# Patient Record
Sex: MALE | Race: WHITE | NOT HISPANIC OR LATINO | Employment: FULL TIME | URBAN - NONMETROPOLITAN AREA
[De-identification: names, ages, dates, MRNs, and addresses within clinical notes are randomized per-mention and may not be internally consistent; named-entity substitution may affect disease eponyms.]

---

## 2023-02-02 PROBLEM — K42.0 UMBILICAL HERNIA, INCARCERATED: Status: ACTIVE | Noted: 2023-02-02

## 2023-02-02 PROBLEM — R73.9 HYPERGLYCEMIA: Status: ACTIVE | Noted: 2023-02-02

## 2023-02-02 PROBLEM — K52.9 AGE (ACUTE GASTROENTERITIS): Status: ACTIVE | Noted: 2023-02-02

## 2023-02-02 PROBLEM — H61.21 IMPACTED CERUMEN, RIGHT EAR: Status: ACTIVE | Noted: 2023-02-02

## 2023-02-02 PROBLEM — B35.1 ONYCHOMYCOSIS OF RIGHT GREAT TOE: Status: ACTIVE | Noted: 2023-02-02

## 2023-02-02 PROBLEM — H93.8X9 EAR FULLNESS: Status: ACTIVE | Noted: 2023-02-02

## 2023-02-02 PROBLEM — I10 BENIGN HYPERTENSION: Status: ACTIVE | Noted: 2023-02-02

## 2023-02-02 PROBLEM — N52.9 ED (ERECTILE DYSFUNCTION): Status: ACTIVE | Noted: 2023-02-02

## 2023-02-02 PROBLEM — E78.1 HYPERTRIGLYCERIDEMIA: Status: ACTIVE | Noted: 2023-02-02

## 2023-02-02 PROBLEM — E11.9 DIABETES MELLITUS (MULTI): Status: ACTIVE | Noted: 2023-02-02

## 2023-02-02 RX ORDER — TADALAFIL 20 MG/1
1 TABLET ORAL DAILY PRN
COMMUNITY
Start: 2022-06-16 | End: 2023-07-13 | Stop reason: ALTCHOICE

## 2023-02-02 RX ORDER — LISINOPRIL 10 MG/1
1 TABLET ORAL DAILY
COMMUNITY
Start: 2016-07-18 | End: 2023-03-07 | Stop reason: SDUPTHER

## 2023-03-06 NOTE — PROGRESS NOTES
Subjective   Patient ID:   Logan Yeager is a 52 y.o. male who presents for Back Pain (Check up/Mid back pain).  HPI  Back pain:  Symptoms x 2 weeks.  No acute injury.  Middle to low back.  Having troubles with sleep.  Worse with movements.    HTN:  Taking Lisinopril.  BP today is 142/94.  Denies dizziness, headaches.     Diabetes:  New onset in July 2022 with A1C of 6.6.  A1C was 6.4 in Dec 2022.  He was to work on diet and exercise.  DUE for re-check.     ED:  Taking Viagra as needed.  This is hit and miss.     HLD:  Not on any medication for this.  Last checked July 2022.     Right foot fungus:  Taking Jublia.     Health maintenance:  Smoking: Current smoker - 1 ppd. Declines cessation  PSA (50+): July 2022  Labs: July 2022. DUE for A1C  Colonoscopy (50-75): DUE - Declines  Influenza:     Review of Systems  12 point review of systems negative unless stated above in HPI    Vitals:    03/07/23 1041   BP: (!) 142/94   Pulse: 66   Resp: 18   SpO2: 94%       Physical Exam  General: Alert and oriented, well nourished, no acute distress.  Lungs: Clear to auscultation, non-labored respiration.  Heart: Normal rate, regular rhythm, no murmur, gallop or edema.  Neurologic: Awake, alert, and oriented X3, CN II-XII intact.  Psychiatric: Cooperative, appropriate mood and affect.    Assessment/Plan   I have sent in Meloxicam for the back pains.  Consider imaging if this does not help.  I have also increased your Lisinopril to 20mg.  Consider checking BP at home.  If symptoms persist or worsen despite current plan of care, please contact your healthcare provider for further evaluation.  Patient instructed to contact the office if there are any questions regarding their care or treatment.   [facility] [phone].  [facility] [phone].  I have ordered some labs to be done as soon as you can.  We will call you with the results.    Follow up  1-2 months  Diagnoses and all orders for this  visit:  Type 2 diabetes mellitus without complication, without long-term current use of insulin (CMS/[facility])  -     Hemoglobin A1C; Future  Benign hypertension  -     lisinopril 20 mg tablet; Take 1 tablet (20 mg) by mouth once daily.  Erectile dysfunction, unspecified erectile dysfunction type  Hypertriglyceridemia  Onychomycosis of right great toe  Acute bilateral thoracic back pain  -     meloxicam (Mobic) 15 mg tablet; Take 1 tablet (15 mg) by mouth once daily.

## 2023-03-07 ENCOUNTER — OFFICE VISIT (OUTPATIENT)
Dept: PRIMARY CARE | Facility: CLINIC | Age: 53
End: 2023-03-07
Payer: COMMERCIAL

## 2023-03-07 ENCOUNTER — APPOINTMENT (OUTPATIENT)
Dept: LAB | Facility: LAB | Age: 53
End: 2023-03-07
Payer: COMMERCIAL

## 2023-03-07 VITALS
WEIGHT: 315 LBS | OXYGEN SATURATION: 94 % | HEART RATE: 66 BPM | BODY MASS INDEX: 42.66 KG/M2 | DIASTOLIC BLOOD PRESSURE: 94 MMHG | HEIGHT: 72 IN | SYSTOLIC BLOOD PRESSURE: 142 MMHG | RESPIRATION RATE: 18 BRPM

## 2023-03-07 DIAGNOSIS — B35.1 ONYCHOMYCOSIS OF RIGHT GREAT TOE: ICD-10-CM

## 2023-03-07 DIAGNOSIS — I10 BENIGN HYPERTENSION: ICD-10-CM

## 2023-03-07 DIAGNOSIS — E11.9 TYPE 2 DIABETES MELLITUS WITHOUT COMPLICATION, WITHOUT LONG-TERM CURRENT USE OF INSULIN (MULTI): Primary | ICD-10-CM

## 2023-03-07 DIAGNOSIS — N52.9 ERECTILE DYSFUNCTION, UNSPECIFIED ERECTILE DYSFUNCTION TYPE: ICD-10-CM

## 2023-03-07 DIAGNOSIS — E78.1 HYPERTRIGLYCERIDEMIA: ICD-10-CM

## 2023-03-07 DIAGNOSIS — M54.6 ACUTE BILATERAL THORACIC BACK PAIN: ICD-10-CM

## 2023-03-07 DIAGNOSIS — E11.9 TYPE 2 DIABETES MELLITUS WITHOUT COMPLICATION, WITHOUT LONG-TERM CURRENT USE OF INSULIN (MULTI): ICD-10-CM

## 2023-03-07 PROCEDURE — 4010F ACE/ARB THERAPY RXD/TAKEN: CPT | Performed by: PHYSICIAN ASSISTANT

## 2023-03-07 PROCEDURE — 99214 OFFICE O/P EST MOD 30 MIN: CPT | Performed by: PHYSICIAN ASSISTANT

## 2023-03-07 PROCEDURE — 3080F DIAST BP >= 90 MM HG: CPT | Performed by: PHYSICIAN ASSISTANT

## 2023-03-07 PROCEDURE — 3077F SYST BP >= 140 MM HG: CPT | Performed by: PHYSICIAN ASSISTANT

## 2023-03-07 RX ORDER — LISINOPRIL 20 MG/1
20 TABLET ORAL DAILY
Qty: 30 TABLET | Refills: 1 | Status: SHIPPED | OUTPATIENT
Start: 2023-03-07 | End: 2023-04-11 | Stop reason: SDUPTHER

## 2023-03-07 RX ORDER — SILDENAFIL 100 MG/1
100 TABLET, FILM COATED ORAL AS NEEDED
COMMUNITY
Start: 2022-04-03 | End: 2023-07-13 | Stop reason: ALTCHOICE

## 2023-03-07 RX ORDER — MELOXICAM 15 MG/1
15 TABLET ORAL DAILY
Qty: 30 TABLET | Refills: 1 | Status: SHIPPED | OUTPATIENT
Start: 2023-03-07 | End: 2023-04-11 | Stop reason: ALTCHOICE

## 2023-03-07 ASSESSMENT — PAIN SCALES - GENERAL: PAINLEVEL: 6

## 2023-03-08 LAB
ESTIMATED AVERAGE GLUCOSE FOR HBA1C: 137 MG/DL
HEMOGLOBIN A1C/HEMOGLOBIN TOTAL IN BLOOD: 6.4 %

## 2023-04-04 PROBLEM — M54.6 ACUTE BILATERAL THORACIC BACK PAIN: Status: ACTIVE | Noted: 2023-04-04

## 2023-04-04 NOTE — PROGRESS NOTES
Subjective   Patient ID:   Logan Yeager is a 52 y.o. male who presents for Follow-up (General checkup).  HPI  Back pain:  I gave Meloxicam last visit.  This resolved!    HTN:  Taking Lisinopril - we increased this last visit.  Had been on hydrochlorothiazide as well before, but this caused his BP to go too low.  BP today is 149/91.  Denies dizziness, headaches.     Diabetes:  New onset in July 2022 with A1C of 6.6.  A1C was 6.4 in March 2023.  He was to work on diet and exercise.     ED:  Taking Viagra as needed.  This is hit and   Has tried and failed Cialis as well.  No recent testosterone level.     HLD:  Not on any medication for this.  Last checked July 2022.     Right foot fungus:  Taking Jublia.     Health maintenance:  Smoking: Current smoker - 1 ppd. Declines cessation  PSA (50+): July 2022  Labs: July 2022.  Colonoscopy (50-75): DUE - Declines  Influenza:     Review of Systems  12 point review of systems negative unless stated above in HPI    Vitals:    04/11/23 0819   BP: (!) 149/91   Pulse: 75   SpO2: 98%         Physical Exam  General: Alert and oriented, well nourished, no acute distress.  Lungs: Clear to auscultation, non-labored respiration.  Heart: Normal rate, regular rhythm, no murmur, gallop or edema.  Neurologic: Awake, alert, and oriented X3, CN II-XII intact.  Psychiatric: Cooperative, appropriate mood and affect.    Assessment/Plan   BP is still high today.  I have increased the Lisinopril to 40mg.  Consider checking at home.  I have placed a referral to urology for further management.  I have ordered some labs to be done as soon as you can.  We will call you with the results.  Continue the same medications.  Chronic conditions are stable.  Call with questions or concerns.    F/u  In July for labs  Diagnoses and all orders for this visit:  Type 2 diabetes mellitus without complication, without long-term current use of insulin (CMS/[facility])  Benign hypertension  -     lisinopril 40 mg tablet;  Take 1 tablet (40 mg) by mouth once daily.  Erectile dysfunction, unspecified erectile dysfunction type  -     Testosterone; Future  -     Referral to Urology; Future  Hypertriglyceridemia  Acute bilateral thoracic back pain  Onychomycosis of right great toe

## 2023-04-11 ENCOUNTER — OFFICE VISIT (OUTPATIENT)
Dept: PRIMARY CARE | Facility: CLINIC | Age: 53
End: 2023-04-11
Payer: COMMERCIAL

## 2023-04-11 ENCOUNTER — LAB (OUTPATIENT)
Dept: LAB | Facility: LAB | Age: 53
End: 2023-04-11
Payer: COMMERCIAL

## 2023-04-11 VITALS
BODY MASS INDEX: 42.66 KG/M2 | WEIGHT: 315 LBS | HEIGHT: 72 IN | DIASTOLIC BLOOD PRESSURE: 91 MMHG | SYSTOLIC BLOOD PRESSURE: 149 MMHG | OXYGEN SATURATION: 98 % | HEART RATE: 75 BPM

## 2023-04-11 DIAGNOSIS — E11.9 TYPE 2 DIABETES MELLITUS WITHOUT COMPLICATION, WITHOUT LONG-TERM CURRENT USE OF INSULIN (MULTI): Primary | ICD-10-CM

## 2023-04-11 DIAGNOSIS — I10 BENIGN HYPERTENSION: ICD-10-CM

## 2023-04-11 DIAGNOSIS — M54.6 ACUTE BILATERAL THORACIC BACK PAIN: ICD-10-CM

## 2023-04-11 DIAGNOSIS — E78.1 HYPERTRIGLYCERIDEMIA: ICD-10-CM

## 2023-04-11 DIAGNOSIS — B35.1 ONYCHOMYCOSIS OF RIGHT GREAT TOE: ICD-10-CM

## 2023-04-11 DIAGNOSIS — N52.9 ERECTILE DYSFUNCTION, UNSPECIFIED ERECTILE DYSFUNCTION TYPE: ICD-10-CM

## 2023-04-11 PROCEDURE — 4010F ACE/ARB THERAPY RXD/TAKEN: CPT | Performed by: PHYSICIAN ASSISTANT

## 2023-04-11 PROCEDURE — 84403 ASSAY OF TOTAL TESTOSTERONE: CPT

## 2023-04-11 PROCEDURE — 36415 COLL VENOUS BLD VENIPUNCTURE: CPT

## 2023-04-11 PROCEDURE — 3080F DIAST BP >= 90 MM HG: CPT | Performed by: PHYSICIAN ASSISTANT

## 2023-04-11 PROCEDURE — 3044F HG A1C LEVEL LT 7.0%: CPT | Performed by: PHYSICIAN ASSISTANT

## 2023-04-11 PROCEDURE — 99214 OFFICE O/P EST MOD 30 MIN: CPT | Performed by: PHYSICIAN ASSISTANT

## 2023-04-11 PROCEDURE — 3077F SYST BP >= 140 MM HG: CPT | Performed by: PHYSICIAN ASSISTANT

## 2023-04-11 RX ORDER — LISINOPRIL 40 MG/1
40 TABLET ORAL DAILY
Qty: 90 TABLET | Refills: 1 | Status: SHIPPED | OUTPATIENT
Start: 2023-04-11 | End: 2023-08-23

## 2023-04-11 ASSESSMENT — PATIENT HEALTH QUESTIONNAIRE - PHQ9
1. LITTLE INTEREST OR PLEASURE IN DOING THINGS: NOT AT ALL
SUM OF ALL RESPONSES TO PHQ9 QUESTIONS 1 AND 2: 0
2. FEELING DOWN, DEPRESSED OR HOPELESS: NOT AT ALL

## 2023-04-12 LAB — TESTOSTERONE (NG/DL) IN SER/PLAS: 143 NG/DL (ref 240–1000)

## 2023-07-05 PROBLEM — R79.89 LOW TESTOSTERONE: Status: ACTIVE | Noted: 2023-07-05

## 2023-07-05 NOTE — PROGRESS NOTES
Subjective   Patient ID:   Logan Yeager is a 53 y.o. male who presents for Follow-up.  HPI  HTN:  Taking Lisinopril - we increased this last visit.  Had been on hydrochlorothiazide as well before, but this caused his BP to go too low.  BP today is 140/83.  Denies dizziness, headaches.     Diabetes:  New onset in July 2022 with A1C of 6.6.  A1C was 6.4 in March 2023.  He was to work on diet and exercise.  DUE for re-check.     ED:  I referred to urology last visit.  Taking Viagra as needed.  This is hit and miss.  Has tried and failed Cialis as well.  Testosterone was low in April 2023.     HLD:  Not on any medication for this.  Last checked July 2022.  DUE for labs.     Right foot fungus:  Taking Jublia.  This is still bothersome.  Would like his toenail trimmed.     Health maintenance:  Smoking: Current smoker - 1 ppd. Declines cessation  PSA (50+): July 2022. DUE  Labs: July 2022. DUE  Colonoscopy (50-75): DUE - Declines  Influenza:     Review of Systems  12 point review of systems negative unless stated above in HPI    Vitals:    07/13/23 0836   BP: 140/83   Pulse: 66   Resp: 18   SpO2: 90%       Physical Exam  General: Alert and oriented, well nourished, no acute distress.  Lungs: Clear to auscultation, non-labored respiration.  Heart: Normal rate, regular rhythm, no murmur, gallop or edema.  Neurologic: Awake, alert, and oriented X3, CN II-XII intact.  Psychiatric: Cooperative, appropriate mood and affect.    Assessment/Plan   Your BP is better today!  Consider checking at home.  I have placed a referral to podiatry for further management.  I have ordered some labs to be done as soon as you can.  We will call you with the results.  Continue the same medications.  Chronic conditions are stable.  Call with questions or concerns.    F/u  6 months  Diagnoses and all orders for this visit:  Type 2 diabetes mellitus without complication, without long-term current use of insulin (CMS/[facility])  -     Comprehensive Metabolic  Panel; Future  -     Lipid Panel; Future  -     CBC and Auto Differential; Future  -     Hemoglobin A1C; Future  Benign hypertension  Erectile dysfunction, unspecified erectile dysfunction type  Hypertriglyceridemia  Onychomycosis of right great toe  -     Referral to Podiatry; Future  Low testosterone  Screening PSA (prostate specific antigen)  -     Prostate Specific Antigen, Screen; Future

## 2023-07-13 ENCOUNTER — OFFICE VISIT (OUTPATIENT)
Dept: PRIMARY CARE | Facility: CLINIC | Age: 53
End: 2023-07-13
Payer: COMMERCIAL

## 2023-07-13 VITALS
RESPIRATION RATE: 18 BRPM | BODY MASS INDEX: 42.66 KG/M2 | DIASTOLIC BLOOD PRESSURE: 83 MMHG | HEART RATE: 66 BPM | HEIGHT: 72 IN | WEIGHT: 315 LBS | SYSTOLIC BLOOD PRESSURE: 140 MMHG | OXYGEN SATURATION: 90 %

## 2023-07-13 DIAGNOSIS — N52.9 ERECTILE DYSFUNCTION, UNSPECIFIED ERECTILE DYSFUNCTION TYPE: ICD-10-CM

## 2023-07-13 DIAGNOSIS — Z12.5 SCREENING PSA (PROSTATE SPECIFIC ANTIGEN): ICD-10-CM

## 2023-07-13 DIAGNOSIS — E78.1 HYPERTRIGLYCERIDEMIA: ICD-10-CM

## 2023-07-13 DIAGNOSIS — I10 BENIGN HYPERTENSION: ICD-10-CM

## 2023-07-13 DIAGNOSIS — E11.9 TYPE 2 DIABETES MELLITUS WITHOUT COMPLICATION, WITHOUT LONG-TERM CURRENT USE OF INSULIN (MULTI): Primary | ICD-10-CM

## 2023-07-13 DIAGNOSIS — B35.1 ONYCHOMYCOSIS OF RIGHT GREAT TOE: ICD-10-CM

## 2023-07-13 DIAGNOSIS — R79.89 LOW TESTOSTERONE: ICD-10-CM

## 2023-07-13 PROBLEM — K42.9 UMBILICAL HERNIA: Status: ACTIVE | Noted: 2023-02-02

## 2023-07-13 PROBLEM — F17.200 SMOKING: Status: ACTIVE | Noted: 2023-07-13

## 2023-07-13 PROBLEM — H61.21 IMPACTED CERUMEN OF RIGHT EAR: Status: RESOLVED | Noted: 2023-07-13 | Resolved: 2023-07-13

## 2023-07-13 PROBLEM — E66.01 MORBID OBESITY (MULTI): Status: ACTIVE | Noted: 2023-07-13

## 2023-07-13 PROCEDURE — 3079F DIAST BP 80-89 MM HG: CPT | Performed by: PHYSICIAN ASSISTANT

## 2023-07-13 PROCEDURE — 3077F SYST BP >= 140 MM HG: CPT | Performed by: PHYSICIAN ASSISTANT

## 2023-07-13 PROCEDURE — 3044F HG A1C LEVEL LT 7.0%: CPT | Performed by: PHYSICIAN ASSISTANT

## 2023-07-13 PROCEDURE — 99213 OFFICE O/P EST LOW 20 MIN: CPT | Performed by: PHYSICIAN ASSISTANT

## 2023-07-13 PROCEDURE — 4010F ACE/ARB THERAPY RXD/TAKEN: CPT | Performed by: PHYSICIAN ASSISTANT

## 2023-07-13 RX ORDER — TADALAFIL 10 MG/1
10 TABLET ORAL AS NEEDED
COMMUNITY
End: 2024-01-11 | Stop reason: ALTCHOICE

## 2023-07-13 RX ORDER — TADALAFIL 5 MG/1
5 TABLET ORAL DAILY
COMMUNITY

## 2023-07-13 ASSESSMENT — PATIENT HEALTH QUESTIONNAIRE - PHQ9
SUM OF ALL RESPONSES TO PHQ9 QUESTIONS 1 AND 2: 0
2. FEELING DOWN, DEPRESSED OR HOPELESS: NOT AT ALL
1. LITTLE INTEREST OR PLEASURE IN DOING THINGS: NOT AT ALL

## 2023-07-13 ASSESSMENT — PAIN SCALES - GENERAL: PAINLEVEL: 0-NO PAIN

## 2023-07-15 ENCOUNTER — LAB (OUTPATIENT)
Dept: LAB | Facility: LAB | Age: 53
End: 2023-07-15
Payer: COMMERCIAL

## 2023-07-15 DIAGNOSIS — E11.9 TYPE 2 DIABETES MELLITUS WITHOUT COMPLICATION, WITHOUT LONG-TERM CURRENT USE OF INSULIN (MULTI): ICD-10-CM

## 2023-07-15 DIAGNOSIS — Z12.5 SCREENING PSA (PROSTATE SPECIFIC ANTIGEN): ICD-10-CM

## 2023-07-15 LAB
ALANINE AMINOTRANSFERASE (SGPT) (U/L) IN SER/PLAS: 35 U/L (ref 10–52)
ALBUMIN (G/DL) IN SER/PLAS: 4.2 G/DL (ref 3.4–5)
ALKALINE PHOSPHATASE (U/L) IN SER/PLAS: 61 U/L (ref 33–120)
ANION GAP IN SER/PLAS: 10 MMOL/L (ref 10–20)
ASPARTATE AMINOTRANSFERASE (SGOT) (U/L) IN SER/PLAS: 25 U/L (ref 9–39)
BASOPHILS (10*3/UL) IN BLOOD BY AUTOMATED COUNT: 0.04 X10E9/L (ref 0–0.1)
BASOPHILS/100 LEUKOCYTES IN BLOOD BY AUTOMATED COUNT: 0.6 % (ref 0–2)
BILIRUBIN TOTAL (MG/DL) IN SER/PLAS: 0.6 MG/DL (ref 0–1.2)
CALCIUM (MG/DL) IN SER/PLAS: 9.1 MG/DL (ref 8.6–10.3)
CARBON DIOXIDE, TOTAL (MMOL/L) IN SER/PLAS: 30 MMOL/L (ref 21–32)
CHLORIDE (MMOL/L) IN SER/PLAS: 103 MMOL/L (ref 98–107)
CHOLESTEROL (MG/DL) IN SER/PLAS: 167 MG/DL (ref 0–199)
CHOLESTEROL IN HDL (MG/DL) IN SER/PLAS: 26.8 MG/DL
CHOLESTEROL/HDL RATIO: 6.2
CREATININE (MG/DL) IN SER/PLAS: 1.12 MG/DL (ref 0.5–1.3)
EOSINOPHILS (10*3/UL) IN BLOOD BY AUTOMATED COUNT: 0.12 X10E9/L (ref 0–0.7)
EOSINOPHILS/100 LEUKOCYTES IN BLOOD BY AUTOMATED COUNT: 1.8 % (ref 0–6)
ERYTHROCYTE DISTRIBUTION WIDTH (RATIO) BY AUTOMATED COUNT: 13.4 % (ref 11.5–14.5)
ERYTHROCYTE MEAN CORPUSCULAR HEMOGLOBIN CONCENTRATION (G/DL) BY AUTOMATED: 31.3 G/DL (ref 32–36)
ERYTHROCYTE MEAN CORPUSCULAR VOLUME (FL) BY AUTOMATED COUNT: 95 FL (ref 80–100)
ERYTHROCYTES (10*6/UL) IN BLOOD BY AUTOMATED COUNT: 5.11 X10E12/L (ref 4.5–5.9)
ESTIMATED AVERAGE GLUCOSE FOR HBA1C: 143 MG/DL
GFR MALE: 78 ML/MIN/1.73M2
GLUCOSE (MG/DL) IN SER/PLAS: 129 MG/DL (ref 74–99)
HEMATOCRIT (%) IN BLOOD BY AUTOMATED COUNT: 48.5 % (ref 41–52)
HEMOGLOBIN (G/DL) IN BLOOD: 15.2 G/DL (ref 13.5–17.5)
HEMOGLOBIN A1C/HEMOGLOBIN TOTAL IN BLOOD: 6.6 %
IMMATURE GRANULOCYTES/100 LEUKOCYTES IN BLOOD BY AUTOMATED COUNT: 0.6 % (ref 0–0.9)
LDL: 100 MG/DL (ref 0–99)
LEUKOCYTES (10*3/UL) IN BLOOD BY AUTOMATED COUNT: 6.9 X10E9/L (ref 4.4–11.3)
LYMPHOCYTES (10*3/UL) IN BLOOD BY AUTOMATED COUNT: 2.16 X10E9/L (ref 1.2–4.8)
LYMPHOCYTES/100 LEUKOCYTES IN BLOOD BY AUTOMATED COUNT: 31.5 % (ref 13–44)
MONOCYTES (10*3/UL) IN BLOOD BY AUTOMATED COUNT: 0.68 X10E9/L (ref 0.1–1)
MONOCYTES/100 LEUKOCYTES IN BLOOD BY AUTOMATED COUNT: 9.9 % (ref 2–10)
NEUTROPHILS (10*3/UL) IN BLOOD BY AUTOMATED COUNT: 3.81 X10E9/L (ref 1.2–7.7)
NEUTROPHILS/100 LEUKOCYTES IN BLOOD BY AUTOMATED COUNT: 55.6 % (ref 40–80)
NON HDL CHOLESTEROL: 140 MG/DL
PLATELETS (10*3/UL) IN BLOOD AUTOMATED COUNT: 278 X10E9/L (ref 150–450)
POTASSIUM (MMOL/L) IN SER/PLAS: 4.3 MMOL/L (ref 3.5–5.3)
PROSTATE SPECIFIC ANTIGEN,SCREEN: 0.33 NG/ML (ref 0–4)
PROTEIN TOTAL: 7.2 G/DL (ref 6.4–8.2)
SODIUM (MMOL/L) IN SER/PLAS: 139 MMOL/L (ref 136–145)
TRIGLYCERIDE (MG/DL) IN SER/PLAS: 202 MG/DL (ref 0–149)
UREA NITROGEN (MG/DL) IN SER/PLAS: 12 MG/DL (ref 6–23)
VLDL: 40 MG/DL (ref 0–40)

## 2023-07-15 PROCEDURE — 85025 COMPLETE CBC W/AUTO DIFF WBC: CPT

## 2023-07-15 PROCEDURE — 83036 HEMOGLOBIN GLYCOSYLATED A1C: CPT

## 2023-07-15 PROCEDURE — 80053 COMPREHEN METABOLIC PANEL: CPT

## 2023-07-15 PROCEDURE — 36415 COLL VENOUS BLD VENIPUNCTURE: CPT

## 2023-07-15 PROCEDURE — 84153 ASSAY OF PSA TOTAL: CPT

## 2023-07-15 PROCEDURE — 80061 LIPID PANEL: CPT

## 2023-07-16 NOTE — RESULT ENCOUNTER NOTE
A1C is back up to 6.6. Needs to work harder on diet and exercise to avoid having to start medication

## 2023-08-22 DIAGNOSIS — I10 BENIGN HYPERTENSION: ICD-10-CM

## 2023-08-23 RX ORDER — LISINOPRIL 40 MG/1
40 TABLET ORAL DAILY
Qty: 90 TABLET | Refills: 1 | Status: SHIPPED | OUTPATIENT
Start: 2023-08-23 | End: 2024-04-01

## 2023-09-08 LAB
TESTOSTERONE FREE (CHAN): 35.5 PG/ML (ref 35–155)
TESTOSTERONE,TOTAL,LC-MS/MS: 252 NG/DL (ref 250–1100)

## 2023-10-03 ENCOUNTER — APPOINTMENT (OUTPATIENT)
Dept: PODIATRY | Facility: CLINIC | Age: 53
End: 2023-10-03
Payer: COMMERCIAL

## 2023-11-28 ENCOUNTER — TELEMEDICINE (OUTPATIENT)
Dept: PODIATRY | Facility: CLINIC | Age: 53
End: 2023-11-28
Payer: COMMERCIAL

## 2023-11-28 DIAGNOSIS — E11.9 TYPE 2 DIABETES MELLITUS WITHOUT COMPLICATION, WITHOUT LONG-TERM CURRENT USE OF INSULIN (MULTI): Primary | ICD-10-CM

## 2023-11-28 DIAGNOSIS — B35.1 ONYCHOMYCOSIS: ICD-10-CM

## 2023-11-28 PROCEDURE — 99212 OFFICE O/P EST SF 10 MIN: CPT | Performed by: PODIATRIST

## 2023-11-28 NOTE — PROGRESS NOTES
History of Present Illness:   Patient states they are here for follow up of lamisil  States he has been taking for 6 weeks  Has noticed some improvement already  Denies trauma  No other pedal complaints    Past Medical History  Past Medical History:   Diagnosis Date    Impacted cerumen of right ear 07/13/2023    Morbid (severe) obesity due to excess calories (CMS/[facility]) 03/29/2016    Morbid obesity    Morbid (severe) obesity due to excess calories (CMS/[facility]) 03/29/2016    Morbid obesity    Personal history of other endocrine, nutritional and metabolic disease     History of obesity       Medications and Allergies have been reviewed.    Review Of Systems:  GENERAL: No weight loss, malaise or fevers.  HEENT: Negative for frequent or significant headaches,   RESPIRATORY: Negative for cough, wheezing or shortness of breath.  CARDIOVASCULAR: Negative for chest pain, leg swelling or palpitations.    Physical Exam:  Patient is a pleasant, cooperative, well developed 53 y.o.  adult male. The patient is alert and oriented to time, place and person.   Patient has normal affect and mood.  Virutal exam today    Prior physical exam:    Vascular exam: Dorsalis pedis and Posterior Tibial pulses palpable 2/4 bilateral. Capillary refill time less than 3 seconds b/l. Hair growth noted to digits. No edema noted. Skin temp warm to warm from proximal to distal b/l. No varicosities noted.     Neurologic exam: Gross sensation present b/l. No neuro deficits noted b/l      Musculoskeletal exam: Muscle strength 5/5 for all major muscle groups tested in the lower extremity b/l. No gross deformities noted b/l.      Dermatologic exam: Nails 1 and 5 L are thickened, elongated and discolored. Webspaces 1-4 b/l are clean, dry and intact. No primary or secondary skin lesions noted. No open lesions or wounds noted at this time .     1. Type 2 diabetes mellitus without complication, without long-term current use of insulin (CMS/[facility])  Comprehensive  Metabolic Panel      2. Onychomycosis  Comprehensive Metabolic Panel        Patient eval virtual encounter  No concerns noted when taking med  Will redoCMP  Placed order, please go to lab - do not have to be fasting  Will call with results  Fu 3-6 mos  Sooner if any new prob arise  Patient was in agreement to this plan. All questions answered.      Eloisa Villafana DPM  [phone]  [address]  Fax: [phone]

## 2023-12-02 ENCOUNTER — LAB (OUTPATIENT)
Dept: LAB | Facility: LAB | Age: 53
End: 2023-12-02
Payer: COMMERCIAL

## 2023-12-02 DIAGNOSIS — E11.9 TYPE 2 DIABETES MELLITUS WITHOUT COMPLICATION, WITHOUT LONG-TERM CURRENT USE OF INSULIN (MULTI): ICD-10-CM

## 2023-12-02 DIAGNOSIS — B35.1 ONYCHOMYCOSIS: ICD-10-CM

## 2023-12-02 LAB
ALBUMIN SERPL BCP-MCNC: 4.2 G/DL (ref 3.4–5)
ALP SERPL-CCNC: 67 U/L (ref 33–120)
ALT SERPL W P-5'-P-CCNC: 27 U/L (ref 10–52)
ANION GAP SERPL CALC-SCNC: 13 MMOL/L (ref 10–20)
AST SERPL W P-5'-P-CCNC: 18 U/L (ref 9–39)
BILIRUB SERPL-MCNC: 0.3 MG/DL (ref 0–1.2)
BUN SERPL-MCNC: 12 MG/DL (ref 6–23)
CALCIUM SERPL-MCNC: 9.1 MG/DL (ref 8.6–10.3)
CHLORIDE SERPL-SCNC: 104 MMOL/L (ref 98–107)
CO2 SERPL-SCNC: 26 MMOL/L (ref 21–32)
CREAT SERPL-MCNC: 1.02 MG/DL (ref 0.5–1.3)
GFR SERPL CREATININE-BSD FRML MDRD: 87 ML/MIN/1.73M*2
GLUCOSE SERPL-MCNC: 120 MG/DL (ref 74–99)
POTASSIUM SERPL-SCNC: 4.5 MMOL/L (ref 3.5–5.3)
PROT SERPL-MCNC: 7.3 G/DL (ref 6.4–8.2)
SODIUM SERPL-SCNC: 138 MMOL/L (ref 136–145)

## 2023-12-02 PROCEDURE — 36415 COLL VENOUS BLD VENIPUNCTURE: CPT

## 2024-01-11 ENCOUNTER — APPOINTMENT (OUTPATIENT)
Dept: PRIMARY CARE | Facility: CLINIC | Age: 54
End: 2024-01-11
Payer: COMMERCIAL

## 2024-01-11 ENCOUNTER — OFFICE VISIT (OUTPATIENT)
Dept: PRIMARY CARE | Facility: CLINIC | Age: 54
End: 2024-01-11
Payer: COMMERCIAL

## 2024-01-11 VITALS
OXYGEN SATURATION: 95 % | WEIGHT: 315 LBS | DIASTOLIC BLOOD PRESSURE: 90 MMHG | HEIGHT: 72 IN | HEART RATE: 67 BPM | SYSTOLIC BLOOD PRESSURE: 132 MMHG | RESPIRATION RATE: 18 BRPM | BODY MASS INDEX: 42.66 KG/M2

## 2024-01-11 DIAGNOSIS — B35.3 RECURRENT TINEA PEDIS: ICD-10-CM

## 2024-01-11 DIAGNOSIS — Z00.00 HEALTHCARE MAINTENANCE: ICD-10-CM

## 2024-01-11 DIAGNOSIS — E78.1 HYPERTRIGLYCERIDEMIA: ICD-10-CM

## 2024-01-11 DIAGNOSIS — Z12.5 SCREENING PSA (PROSTATE SPECIFIC ANTIGEN): ICD-10-CM

## 2024-01-11 DIAGNOSIS — I10 BENIGN HYPERTENSION: ICD-10-CM

## 2024-01-11 DIAGNOSIS — E11.9 TYPE 2 DIABETES MELLITUS WITHOUT COMPLICATION, WITHOUT LONG-TERM CURRENT USE OF INSULIN (MULTI): ICD-10-CM

## 2024-01-11 PROBLEM — E66.01 MORBID OBESITY (MULTI): Status: RESOLVED | Noted: 2023-07-13 | Resolved: 2024-01-11

## 2024-01-11 PROBLEM — H61.21 IMPACTED CERUMEN, RIGHT EAR: Status: RESOLVED | Noted: 2023-02-02 | Resolved: 2024-01-11

## 2024-01-11 PROBLEM — K52.9 AGE (ACUTE GASTROENTERITIS): Status: RESOLVED | Noted: 2023-02-02 | Resolved: 2024-01-11

## 2024-01-11 PROBLEM — M54.6 ACUTE BILATERAL THORACIC BACK PAIN: Status: RESOLVED | Noted: 2023-04-04 | Resolved: 2024-01-11

## 2024-01-11 PROBLEM — R73.9 HYPERGLYCEMIA: Status: RESOLVED | Noted: 2023-02-02 | Resolved: 2024-01-11

## 2024-01-11 PROBLEM — H93.8X9 EAR FULLNESS: Status: RESOLVED | Noted: 2023-02-02 | Resolved: 2024-01-11

## 2024-01-11 LAB — POC HEMOGLOBIN A1C: 7.4 % (ref 4.2–6.5)

## 2024-01-11 PROCEDURE — 99214 OFFICE O/P EST MOD 30 MIN: CPT | Performed by: INTERNAL MEDICINE

## 2024-01-11 PROCEDURE — 3080F DIAST BP >= 90 MM HG: CPT | Performed by: INTERNAL MEDICINE

## 2024-01-11 PROCEDURE — 4010F ACE/ARB THERAPY RXD/TAKEN: CPT | Performed by: INTERNAL MEDICINE

## 2024-01-11 PROCEDURE — 83036 HEMOGLOBIN GLYCOSYLATED A1C: CPT | Performed by: INTERNAL MEDICINE

## 2024-01-11 PROCEDURE — 3075F SYST BP GE 130 - 139MM HG: CPT | Performed by: INTERNAL MEDICINE

## 2024-01-11 RX ORDER — METFORMIN HYDROCHLORIDE 750 MG/1
750 TABLET, EXTENDED RELEASE ORAL
Qty: 90 TABLET | Refills: 1 | Status: SHIPPED | OUTPATIENT
Start: 2024-01-11

## 2024-01-11 RX ORDER — CALCIUM CITRATE/VITAMIN D3 200MG-6.25
TABLET ORAL
Qty: 200 STRIP | Refills: 0 | Status: SHIPPED | OUTPATIENT
Start: 2024-01-11 | End: 2024-04-08

## 2024-01-11 RX ORDER — INSULIN PUMP SYRINGE, 3 ML
EACH MISCELLANEOUS
Qty: 1 EACH | Refills: 0 | Status: SHIPPED | OUTPATIENT
Start: 2024-01-11 | End: 2025-01-10

## 2024-01-11 ASSESSMENT — ENCOUNTER SYMPTOMS: MYALGIAS: 1

## 2024-01-11 ASSESSMENT — PAIN SCALES - GENERAL: PAINLEVEL: 6

## 2024-01-11 NOTE — PROGRESS NOTES
Patient ID:   Logan Yeager is a 53 y.o. male with PMH remarkable for DM2, HTN, ED, HLD, umbilical hernia who presents to the office today for Follow-up and Foot Pain (Bilateral foot pain. Sometimes severe upon standing and walking).    HEALTH MAINTENANCE: Follow Up  Smoking: Current Smoker  Labs: 7/15/2023 -> will check at next visit (including TSH)  HgbA1c 6.6 on 7/15/2023 -> DUE, 7.4 today  PSA: 0.33 on 7/15/2023 -> will check at next visit  Colonoscopy (45-75): Declines  Lung cancer screening (55-80 + 30 pack year + smoking/quit in last 15 years): no nodules noted on 2019 CT a/p that was obtained due hernia. It did note mild diffuse fatty infiltration of liver. Noted diverticulosis.   - took PO lamisil x3 months. Followed by Dr Villafana.   - discussed with pt about new diagnosis of diabetes mellitus.  - discussed pros and cons of taking medication vs weekly injection. He is agreeable to try this for DM and weight loss.     SOCIAL HISTORY:  Social History     Tobacco Use    Smoking status: Every Day     Packs/day: 1     Types: Cigarettes    Smokeless tobacco: Never   Substance Use Topics    Alcohol use: Yes     Comment: socially    Drug use: Never     IMMUNIZATIONS:  Immunization History   Administered Date(s) Administered    Flu vaccine (IIV4), preservative free *Check age/dose* 10/10/2022    Influenza, seasonal, injectable 10/08/2021    Pfizer COVID-19 vaccine, Fall 2023, 12 years and older, (30mcg/0.3mL) 10/07/2023    Pfizer COVID-19 vaccine, bivalent, age 12 years and older (30 mcg/0.3 mL) 10/10/2022    Pfizer Purple Cap SARS-CoV-2 03/20/2021, 04/10/2021, 10/22/2021, 10/10/2022    Polio, Unspecified 10/17/1978     REVIEW OF SYSTEMS:  Review of Systems   Musculoskeletal:  Positive for myalgias.   All other systems reviewed and are negative.    ALLERGIES:  Allergies   Allergen Reactions    Codeine Unknown      VITAL SIGNS:  Vitals:    01/11/24 1022   BP: 132/90   Pulse: 67   Resp: 18   SpO2: 95%       Physical  Exam  Vitals reviewed.   Constitutional:       General: He is not in acute distress.     Appearance: Normal appearance. He is obese. He is not ill-appearing.   HENT:      Head: Normocephalic and atraumatic.      Right Ear: Tympanic membrane and external ear normal.      Left Ear: Tympanic membrane and external ear normal.      Nose: Nose normal.      Mouth/Throat:      Mouth: Mucous membranes are moist.      Pharynx: Oropharynx is clear.   Eyes:      Conjunctiva/sclera: Conjunctivae normal.      Pupils: Pupils are equal, round, and reactive to light.   Cardiovascular:      Rate and Rhythm: Normal rate and regular rhythm.      Heart sounds: Normal heart sounds. No murmur heard.  Pulmonary:      Effort: Pulmonary effort is normal. No respiratory distress.      Breath sounds: Decreased breath sounds present. No wheezing.   Abdominal:      General: Bowel sounds are normal. There is distension.      Palpations: Abdomen is soft. There is no mass.      Tenderness: There is no abdominal tenderness.   Musculoskeletal:         General: Normal range of motion.      Cervical back: Normal range of motion and neck supple.   Feet:      Right foot:      Toenail Condition: Fungal disease present.     Left foot:      Toenail Condition: Fungal disease present.  Skin:     General: Skin is warm and dry.   Neurological:      General: No focal deficit present.      Mental Status: He is alert and oriented to person, place, and time.      Sensory: No sensory deficit.      Motor: No weakness.      Coordination: Coordination normal.      Gait: Gait normal.   Psychiatric:         Mood and Affect: Mood normal.         Behavior: Behavior normal. Behavior is cooperative.     MEDICATIONS:  Current Outpatient Medications on File Prior to Visit   Medication Sig Dispense Refill    lisinopril 40 mg tablet TAKE 1 TABLET BY MOUTH EVERY DAY 90 tablet 1    tadalafil (Cialis) 5 mg tablet Take 1 tablet (5 mg) by mouth once daily.       No current  facility-administered medications on file prior to visit.      LABORATORY DATA:  Lab Results   Component Value Date    WBC 6.9 07/15/2023    HGB 15.2 07/15/2023    HCT 48.5 07/15/2023     07/15/2023    CHOL 167 07/15/2023    TRIG 202 (H) 07/15/2023    HDL 26.8 (A) 07/15/2023    ALT 27 12/02/2023    AST 18 12/02/2023     12/02/2023    K 4.5 12/02/2023     12/02/2023    CREATININE 1.02 12/02/2023    BUN 12 12/02/2023    CO2 26 12/02/2023    TSH 0.74 09/25/2018    PSA 0.42 09/17/2020    HGBA1C 7.4 (A) 01/11/2024     ASSESSMENT AND PLAN:  Assessment/Plan   Diagnoses and all orders for this visit:  Healthcare maintenance  -     CBC and Auto Differential; Future  -     Comprehensive Metabolic Panel; Future  -     TSH with reflex to Free T4 if abnormal; Future  -     Vitamin D 25-Hydroxy,Total (for eval of Vitamin D levels); Future  -     Vitamin B12; Future  Type 2 diabetes mellitus without complication, without long-term current use of insulin (CMS/[facility])  -     POCT glycosylated hemoglobin (Hb A1C) manually resulted  -     Hemoglobin A1c; Future  -     semaglutide 0.25 mg or 0.5 mg (2 mg/3 mL) pen injector; Inject 0.25 mg under the skin 1 (one) time per week.  -     metFORMIN XR (Glucophage-XR) 750 mg 24 hr tablet; Take 1 tablet (750 mg) by mouth once daily in the evening. Take with meals. Do not crush, chew, or split.  -     FreeStyle glucose monitoring (True Metrix Glucose Meter) kit; Testing twice daily  -     blood sugar diagnostic (True Metrix Glucose Test Strip) strip; Testing twice daily  Hypertriglyceridemia  -     Lipid Panel; Future  Screening PSA (prostate specific antigen)  -     Prostate Specific Antigen, Screen; Future  Benign hypertension  Comments:  - c/w lisinopril 40mg QD  - /90  Recurrent tinea pedis  Comments:  - took PO lamisil x3 months. Followed by Dr Villafana.   - FU with DPM      --------------------  Written by Rose Marie Moody RN, acting as a scribe for Dr. Alvarado. This  note accurately reflects the work and decisions made by Dr. Alvarado.     I, Dr. Alvarado, attest all medical record entries made by the scribe were under my direction and were personally dictated by me. I have reviewed the chart and agree that the record accurately reflects my performance of the history, physical exam, and assessment and plan.

## 2024-01-17 PROBLEM — Z00.00 HEALTH CARE MAINTENANCE: Status: ACTIVE | Noted: 2024-01-17

## 2024-01-18 PROBLEM — B35.3 RECURRENT TINEA PEDIS: Status: ACTIVE | Noted: 2024-01-18

## 2024-02-16 DIAGNOSIS — E11.9 TYPE 2 DIABETES MELLITUS WITHOUT COMPLICATION, WITHOUT LONG-TERM CURRENT USE OF INSULIN (MULTI): ICD-10-CM

## 2024-03-12 ENCOUNTER — OFFICE VISIT (OUTPATIENT)
Dept: UROLOGY | Facility: HOSPITAL | Age: 54
End: 2024-03-12
Payer: COMMERCIAL

## 2024-03-12 DIAGNOSIS — E29.1 HYPOGONADISM IN MALE: Primary | ICD-10-CM

## 2024-03-12 PROCEDURE — 99214 OFFICE O/P EST MOD 30 MIN: CPT | Performed by: STUDENT IN AN ORGANIZED HEALTH CARE EDUCATION/TRAINING PROGRAM

## 2024-03-12 PROCEDURE — 4010F ACE/ARB THERAPY RXD/TAKEN: CPT | Performed by: STUDENT IN AN ORGANIZED HEALTH CARE EDUCATION/TRAINING PROGRAM

## 2024-03-12 NOTE — PROGRESS NOTES
Subjective   Patient ID: Logan Yeager is a 53 y.o. male    HPI  53 y.o. male who The patient has been diagnosed with ED and cardiac assessment according to the [place] criteria allows use of oral PDE-5 inhibitor. The patient understands that these medications are not initiators of erection and that he will still require sexual stimulation. If prescribed vardenafil or sildenafil, he was advised to take the medication 30-60 minutes prior to sexual activity and that the efficacy of the medication is decrease following a high-fat meal.     The patient verbalized understanding that nitrates such as nitroglycerin, isosorbide mononirate, isosorbide dinitrate and any other nitrate preparations are absolutely contradicted with the use of a PDE-5 inhibitor. The patient was advised to alert any medical person of PDE-5 inhibitor use should he seek urgent medical attention for any reason.     I explained the common adverse effects of therapy including but not limited to headache, flushing, dizziness, rash, upset stomach, diarrhea, nasal congestion, abnormal vision, back pain and myalgia. Less common side effects are lightheadedness or blood pressure drop upon standing. We discussed that patients have  after using medications in this class, and sometimes the exact cause of death was not able to be determined. There is a very small risk of nonartertic ischemic optic neuropathy, a rare cause of blindness that may be permanent while using medications in this class. Patient is instructed to immediately stop this medication and seek medical attention if he develops visual disturbance in one or both eyes.     We discussed that if he experiences erection lasting longer than four hours, he needs to seek immediate treatment at the ER as the longer he waits, the more damage is done to the penile tissue. The patient states that he is not withholding any information about his condition or the use of medications in order to receive PDE-5  inhibitor class medication. No barriers to learning were identified. After all of the patients questions were satisfactorily answered, he expressed understanding of the risks of therapy and wishes to proceed.      The most recent PSA, conducted on 7/15/2024, revealed 0.33 ng/ml     Review of Systems    All systems were reviewed. Anything negative was noted in the HPI.    Objective   Physical Exam    General: Well developed, well nourished, alert and cooperative, appears in no acute distress   Eyes: Non-injected conjunctiva, sclera clear, no proptosis   Cardiac: Extremities are warm and well perfused. No edema, cyanosis or pallor   Lungs: Breathing is easy, non-labored. Speaking in clear and complete sentences. Normal diaphragmatic movement   MSK: Ambulatory with steady gait, unassisted   Neuro: Alert and oriented to person, place, and time   Psych: Demonstrates good judgment and reason, without hallucinations, abnormal affect or abnormal behaviors   Skin: No obvious lesions, no rashes       No CVA tenderness bilaterally   No suprapubic pain or discomfort       Past Medical History:   Diagnosis Date    Impacted cerumen of right ear 07/13/2023    Morbid (severe) obesity due to excess calories (CMS/HCC) 03/29/2016    Morbid obesity    Morbid (severe) obesity due to excess calories (CMS/HCC) 03/29/2016    Morbid obesity    Personal history of other endocrine, nutritional and metabolic disease     History of obesity         No past surgical history on file.      Assessment/Plan   Erectile Dysfunction    53 y.o. male who presents for the above condition, We had a very long and extensive discussion with the patient regarding the pathophysiology, differential diagnosis, risk factor, management, natural history, incidence and diagnostic work-up of the condition.      Continue current ED medication for a trial period.     Encourage lifestyle modifications, including weight loss, which may improve erectile dysfunction  symptoms. Schedule a follow-up appointment in four months to reassess the efficacy of the current treatment plan and to perform a testosterone level test. Discuss the possibility of penile injection therapy with a compounding pharmacy if no improvement is observed at the next visit.    Plan:  - Follow up in 4 months with Testosterone           Scribe Attestation  By signing my name below, IMarty Scribe   attest that this documentation has been prepared under the direction and in the presence of Dr. Crispin Tinoco

## 2024-03-31 DIAGNOSIS — I10 BENIGN HYPERTENSION: ICD-10-CM

## 2024-04-01 RX ORDER — LISINOPRIL 40 MG/1
40 TABLET ORAL DAILY
Qty: 90 TABLET | Refills: 1 | Status: SHIPPED | OUTPATIENT
Start: 2024-04-01

## 2024-04-07 DIAGNOSIS — E11.9 TYPE 2 DIABETES MELLITUS WITHOUT COMPLICATION, WITHOUT LONG-TERM CURRENT USE OF INSULIN (MULTI): ICD-10-CM

## 2024-04-08 RX ORDER — BLOOD SUGAR DIAGNOSTIC
STRIP MISCELLANEOUS
Qty: 200 STRIP | Refills: 0 | Status: SHIPPED | OUTPATIENT
Start: 2024-04-08

## 2024-04-15 ENCOUNTER — LAB (OUTPATIENT)
Dept: LAB | Facility: LAB | Age: 54
End: 2024-04-15
Payer: COMMERCIAL

## 2024-04-15 DIAGNOSIS — E78.1 HYPERTRIGLYCERIDEMIA: ICD-10-CM

## 2024-04-15 DIAGNOSIS — Z00.00 HEALTHCARE MAINTENANCE: ICD-10-CM

## 2024-04-15 DIAGNOSIS — Z12.5 SCREENING PSA (PROSTATE SPECIFIC ANTIGEN): ICD-10-CM

## 2024-04-15 DIAGNOSIS — E11.9 TYPE 2 DIABETES MELLITUS WITHOUT COMPLICATION, WITHOUT LONG-TERM CURRENT USE OF INSULIN (MULTI): ICD-10-CM

## 2024-04-15 LAB
25(OH)D3 SERPL-MCNC: 8 NG/ML (ref 30–100)
ALBUMIN SERPL BCP-MCNC: 4.3 G/DL (ref 3.4–5)
ALP SERPL-CCNC: 63 U/L (ref 33–120)
ALT SERPL W P-5'-P-CCNC: 20 U/L (ref 10–52)
ANION GAP SERPL CALC-SCNC: 13 MMOL/L (ref 10–20)
AST SERPL W P-5'-P-CCNC: 17 U/L (ref 9–39)
BASOPHILS # BLD AUTO: 0.04 X10*3/UL (ref 0–0.1)
BASOPHILS NFR BLD AUTO: 0.5 %
BILIRUB SERPL-MCNC: 0.5 MG/DL (ref 0–1.2)
BUN SERPL-MCNC: 13 MG/DL (ref 6–23)
CALCIUM SERPL-MCNC: 8.9 MG/DL (ref 8.6–10.3)
CHLORIDE SERPL-SCNC: 104 MMOL/L (ref 98–107)
CHOLEST SERPL-MCNC: 163 MG/DL (ref 0–199)
CHOLESTEROL/HDL RATIO: 5.6
CO2 SERPL-SCNC: 26 MMOL/L (ref 21–32)
CREAT SERPL-MCNC: 0.91 MG/DL (ref 0.5–1.3)
EGFRCR SERPLBLD CKD-EPI 2021: >90 ML/MIN/1.73M*2
EOSINOPHIL # BLD AUTO: 0.14 X10*3/UL (ref 0–0.7)
EOSINOPHIL NFR BLD AUTO: 1.8 %
ERYTHROCYTE [DISTWIDTH] IN BLOOD BY AUTOMATED COUNT: 13.3 % (ref 11.5–14.5)
EST. AVERAGE GLUCOSE BLD GHB EST-MCNC: 114 MG/DL
GLUCOSE SERPL-MCNC: 108 MG/DL (ref 74–99)
HBA1C MFR BLD: 5.6 %
HCT VFR BLD AUTO: 47.5 % (ref 41–52)
HDLC SERPL-MCNC: 29.3 MG/DL
HGB BLD-MCNC: 14.9 G/DL (ref 13.5–17.5)
IMM GRANULOCYTES # BLD AUTO: 0.04 X10*3/UL (ref 0–0.7)
IMM GRANULOCYTES NFR BLD AUTO: 0.5 % (ref 0–0.9)
LDLC SERPL CALC-MCNC: 103 MG/DL
LYMPHOCYTES # BLD AUTO: 2.16 X10*3/UL (ref 1.2–4.8)
LYMPHOCYTES NFR BLD AUTO: 28.2 %
MCH RBC QN AUTO: 28.6 PG (ref 26–34)
MCHC RBC AUTO-ENTMCNC: 31.4 G/DL (ref 32–36)
MCV RBC AUTO: 91 FL (ref 80–100)
MONOCYTES # BLD AUTO: 0.58 X10*3/UL (ref 0.1–1)
MONOCYTES NFR BLD AUTO: 7.6 %
NEUTROPHILS # BLD AUTO: 4.71 X10*3/UL (ref 1.2–7.7)
NEUTROPHILS NFR BLD AUTO: 61.4 %
NON HDL CHOLESTEROL: 134 MG/DL (ref 0–149)
NRBC BLD-RTO: 0 /100 WBCS (ref 0–0)
PLATELET # BLD AUTO: 338 X10*3/UL (ref 150–450)
POTASSIUM SERPL-SCNC: 3.9 MMOL/L (ref 3.5–5.3)
PROT SERPL-MCNC: 7.5 G/DL (ref 6.4–8.2)
PSA SERPL-MCNC: 0.78 NG/ML
RBC # BLD AUTO: 5.21 X10*6/UL (ref 4.5–5.9)
SODIUM SERPL-SCNC: 139 MMOL/L (ref 136–145)
TRIGL SERPL-MCNC: 155 MG/DL (ref 0–149)
TSH SERPL-ACNC: 1.32 MIU/L (ref 0.44–3.98)
VIT B12 SERPL-MCNC: 344 PG/ML (ref 211–911)
VLDL: 31 MG/DL (ref 0–40)
WBC # BLD AUTO: 7.7 X10*3/UL (ref 4.4–11.3)

## 2024-04-15 PROCEDURE — 84153 ASSAY OF PSA TOTAL: CPT

## 2024-04-15 PROCEDURE — 83036 HEMOGLOBIN GLYCOSYLATED A1C: CPT

## 2024-04-15 PROCEDURE — 82607 VITAMIN B-12: CPT

## 2024-04-15 PROCEDURE — 82306 VITAMIN D 25 HYDROXY: CPT

## 2024-04-15 PROCEDURE — 36415 COLL VENOUS BLD VENIPUNCTURE: CPT

## 2024-04-16 ENCOUNTER — OFFICE VISIT (OUTPATIENT)
Dept: PRIMARY CARE | Facility: CLINIC | Age: 54
End: 2024-04-16
Payer: COMMERCIAL

## 2024-04-16 VITALS
WEIGHT: 312.6 LBS | BODY MASS INDEX: 42.34 KG/M2 | DIASTOLIC BLOOD PRESSURE: 84 MMHG | OXYGEN SATURATION: 93 % | RESPIRATION RATE: 18 BRPM | HEIGHT: 72 IN | SYSTOLIC BLOOD PRESSURE: 129 MMHG | HEART RATE: 62 BPM

## 2024-04-16 DIAGNOSIS — Z00.00 HEALTHCARE MAINTENANCE: ICD-10-CM

## 2024-04-16 DIAGNOSIS — R14.3 FLATUS: ICD-10-CM

## 2024-04-16 DIAGNOSIS — K21.9 GASTROESOPHAGEAL REFLUX DISEASE, UNSPECIFIED WHETHER ESOPHAGITIS PRESENT: ICD-10-CM

## 2024-04-16 DIAGNOSIS — E11.9 TYPE 2 DIABETES MELLITUS WITHOUT COMPLICATION, WITHOUT LONG-TERM CURRENT USE OF INSULIN (MULTI): ICD-10-CM

## 2024-04-16 DIAGNOSIS — F17.200 TOBACCO DEPENDENCE: ICD-10-CM

## 2024-04-16 DIAGNOSIS — R06.83 SNORING: ICD-10-CM

## 2024-04-16 DIAGNOSIS — I10 BENIGN HYPERTENSION: ICD-10-CM

## 2024-04-16 DIAGNOSIS — R53.83 OTHER FATIGUE: ICD-10-CM

## 2024-04-16 DIAGNOSIS — E55.9 VITAMIN D DEFICIENCY: Primary | ICD-10-CM

## 2024-04-16 DIAGNOSIS — L91.8 SKIN TAG: ICD-10-CM

## 2024-04-16 PROBLEM — Z86.39 HISTORY OF OBESITY: Status: ACTIVE | Noted: 2024-04-16

## 2024-04-16 PROBLEM — E29.1 HYPOGONADISM IN MALE: Status: ACTIVE | Noted: 2024-04-16

## 2024-04-16 PROCEDURE — 99214 OFFICE O/P EST MOD 30 MIN: CPT | Performed by: INTERNAL MEDICINE

## 2024-04-16 PROCEDURE — 4010F ACE/ARB THERAPY RXD/TAKEN: CPT | Performed by: INTERNAL MEDICINE

## 2024-04-16 PROCEDURE — 3044F HG A1C LEVEL LT 7.0%: CPT | Performed by: INTERNAL MEDICINE

## 2024-04-16 PROCEDURE — 3074F SYST BP LT 130 MM HG: CPT | Performed by: INTERNAL MEDICINE

## 2024-04-16 PROCEDURE — 3049F LDL-C 100-129 MG/DL: CPT | Performed by: INTERNAL MEDICINE

## 2024-04-16 PROCEDURE — 3079F DIAST BP 80-89 MM HG: CPT | Performed by: INTERNAL MEDICINE

## 2024-04-16 RX ORDER — ERGOCALCIFEROL 1.25 MG/1
50000 CAPSULE ORAL
Qty: 16 CAPSULE | Refills: 0 | Status: SHIPPED | OUTPATIENT
Start: 2024-04-21 | End: 2024-08-11

## 2024-04-16 RX ORDER — OMEPRAZOLE 20 MG/1
40 CAPSULE, DELAYED RELEASE ORAL
Qty: 180 CAPSULE | Refills: 0 | Status: SHIPPED | OUTPATIENT
Start: 2024-04-16

## 2024-04-16 NOTE — PROGRESS NOTES
Patient ID:   Logan Yeager is a 53 y.o. male with PMH remarkable for DM2, HTN, ED, HLD, umbilical hernia who presents to the office today for Follow-up, Heartburn (Burping and heart burn when taking ozempic ), and Skin Tag (Behind left knee ).    HEALTH MAINTENANCE: Follow Up  Last Office Visit: 1/11/2024  Smoking: Current Smoker  Labs: 7/15/2023 -> 4/15/2024  HgbA1c 6.6 on 7/15/2023 -> 7.4 -> 5.6 on 4/15/2024  PSA: 0.33 on 7/15/2023 -> 0.78 on 4/15/2024  Colonoscopy (45-75): Declines  Lung cancer screening (55-80 + 30 pack year + smoking/quit in last 15 years): never had a dedicated scan of lungs. Will discuss at next visit.   - in 2019 CT a/p that was obtained due hernia. It did note mild diffuse fatty infiltration of liver. Noted diverticulosis.   - took PO lamisil x3 months. Followed by Dr Villafana.   - increased GERD, diarrhea and foul smelling burps with the semaglutide.   - down 15# since last visit in Jan 2024.  - agrees with increasing dosage of ozempic to 1mg qwk    Social History     Tobacco Use    Smoking status: Every Day     Current packs/day: 1.00     Types: Cigarettes    Smokeless tobacco: Never   Substance Use Topics    Alcohol use: Yes     Comment: socially    Drug use: Never     Review of Systems   Constitutional:  Positive for fatigue.   Gastrointestinal:  Positive for diarrhea.   All other systems reviewed and are negative.    Visit Vitals  /84   Pulse 62   Resp 18   Ht 1.829 m (6')   Wt 142 kg (312 lb 9.6 oz)   SpO2 93%   BMI 42.40 kg/m²   Smoking Status Every Day   BSA 2.69 m²     Physical Exam  Vitals reviewed.   Constitutional:       General: He is not in acute distress.     Appearance: Normal appearance. He is not ill-appearing.   HENT:      Head: Normocephalic and atraumatic.      Right Ear: Tympanic membrane and external ear normal.      Left Ear: Tympanic membrane and external ear normal.      Nose: Nose normal.      Mouth/Throat:      Mouth: Mucous membranes are moist.      Pharynx:  Oropharynx is clear.   Eyes:      Conjunctiva/sclera: Conjunctivae normal.      Pupils: Pupils are equal, round, and reactive to light.   Cardiovascular:      Rate and Rhythm: Normal rate and regular rhythm.      Heart sounds: Normal heart sounds. No murmur heard.  Pulmonary:      Effort: Pulmonary effort is normal. No respiratory distress.      Breath sounds: Decreased breath sounds present. No wheezing.   Abdominal:      General: Abdomen is protuberant. Bowel sounds are normal. There is distension.      Palpations: Abdomen is soft. There is no mass.      Tenderness: There is no abdominal tenderness.   Musculoskeletal:         General: Normal range of motion.      Cervical back: Normal range of motion and neck supple.   Skin:     General: Skin is warm and dry.      Comments: Skin tag behind left knee   Neurological:      General: No focal deficit present.      Mental Status: He is alert and oriented to person, place, and time.      Sensory: No sensory deficit.      Motor: No weakness.      Coordination: Coordination normal.      Gait: Gait normal.   Psychiatric:         Mood and Affect: Mood normal.         Behavior: Behavior normal.       Current Outpatient Medications   Medication Instructions    blood sugar diagnostic (OneTouch Ultra Test) strip TESTING TWICE DAILY    [START ON 4/21/2024] ergocalciferol (VITAMIN D-2) 50,000 Units, oral, Once Weekly    FreeStyle glucose monitoring (True Metrix Glucose Meter) kit Testing twice daily    lisinopril 40 mg, oral, Daily    metFORMIN XR (GLUCOPHAGE-XR) 750 mg, oral, Daily with evening meal, Do not crush, chew, or split.    omeprazole (PRILOSEC) 40 mg, oral, Daily before breakfast, Do not crush or chew.    semaglutide (OZEMPIC) 1 mg, subcutaneous, Every 7 days    tadalafil (CIALIS) 5 mg, oral, Daily      Lab Results   Component Value Date    WBC 7.7 04/15/2024    HGB 14.9 04/15/2024    HCT 47.5 04/15/2024     04/15/2024    CHOL 163 04/15/2024    TRIG 155 (H)  04/15/2024    HDL 29.3 04/15/2024    ALT 20 04/15/2024    AST 17 04/15/2024     04/15/2024    K 3.9 04/15/2024     04/15/2024    CREATININE 0.91 04/15/2024    BUN 13 04/15/2024    CO2 26 04/15/2024    TSH 1.32 04/15/2024    PSA 0.42 09/17/2020    HGBA1C 5.6 04/15/2024     Assessment/Plan   Problem List Items Addressed This Visit             ICD-10-CM    Benign hypertension I10     - c/w lisinopril 40 QD         Diabetes mellitus (Multi) E11.9     - A1c much imp  - c/w metformin XR 750mg daily  - monitor BGL  - increase semaglutide to 1mg qwk         Relevant Medications    semaglutide (OZEMPIC) 1 mg/dose (4 mg/3 mL) pen injector    Tobacco dependence F17.200     - will discuss about dedicated CT lung scan on next visit         Healthcare maintenance Z00.00    Relevant Orders    Full code (Completed)    Gastroesophageal reflux disease K21.9     - start on omeprazole 40mg every day x30 days         Relevant Medications    omeprazole (PriLOSEC) 20 mg DR capsule    Snoring R06.83    Relevant Orders    In-Center Sleep Study (Non-Sleep Provider Only)    Other fatigue R53.83     - will get sleep study to rule out YAHAIRA         Relevant Orders    In-Center Sleep Study (Non-Sleep Provider Only)    Skin tag L91.8     - make an appt for removal of this area         Flatus R14.3     - increased since on semaglutide  - advised to grab some over the counter simethicone to try           --------------------  Written by Rose Marie Moody RN, acting as a scribe for Dr. Alvarado. This note accurately reflects the work and decisions made by Dr. Alvarado.     I, Dr. Alvarado, attest all medical record entries made by the scribe were under my direction and were personally dictated by me. I have reviewed the chart and agree that the record accurately reflects my performance of the history, physical exam, and assessment and plan.

## 2024-04-16 NOTE — PATIENT INSTRUCTIONS
It was nice to see you in the office today!  As discussed during our visit...     Great job on the weight loss and bringing your HgbA1c down!  We are sending an increased dose of the Ozempic to the pharmacy for you.  You can try omeprazole 40mg every day for the acid reflux. (We sent a script in for this)  You can try Simethicone tabs (GasX) for the increased burping. (This is over the counter).  We ordered an overnight sleep study for you to check to see about sleep apnea.   Once we get the results, we will call you with them after Dr Alvarado reviews them.  Someone will call / text you a # soon to set up this appointment. If you do not hear anything by the end of the week, call and let us know please.  Follow up in 4 months or sooner if needed.

## 2024-04-18 PROBLEM — R06.83 SNORING: Status: ACTIVE | Noted: 2024-04-18

## 2024-04-18 PROBLEM — K21.9 GASTROESOPHAGEAL REFLUX DISEASE: Status: ACTIVE | Noted: 2024-04-18

## 2024-04-18 PROBLEM — R53.83 OTHER FATIGUE: Status: ACTIVE | Noted: 2024-04-18

## 2024-04-18 PROBLEM — R14.3 FLATUS: Status: ACTIVE | Noted: 2024-04-18

## 2024-04-18 PROBLEM — L91.8 SKIN TAG: Status: ACTIVE | Noted: 2024-04-18

## 2024-04-18 ASSESSMENT — ENCOUNTER SYMPTOMS
DIARRHEA: 1
FATIGUE: 1

## 2024-04-25 ENCOUNTER — OFFICE VISIT (OUTPATIENT)
Dept: PRIMARY CARE | Facility: CLINIC | Age: 54
End: 2024-04-25
Payer: COMMERCIAL

## 2024-04-25 VITALS
OXYGEN SATURATION: 94 % | RESPIRATION RATE: 18 BRPM | DIASTOLIC BLOOD PRESSURE: 78 MMHG | WEIGHT: 303.8 LBS | BODY MASS INDEX: 41.15 KG/M2 | HEART RATE: 75 BPM | SYSTOLIC BLOOD PRESSURE: 118 MMHG | HEIGHT: 72 IN

## 2024-04-25 DIAGNOSIS — L91.8 SKIN TAG: ICD-10-CM

## 2024-04-25 DIAGNOSIS — Z00.00 HEALTHCARE MAINTENANCE: ICD-10-CM

## 2024-04-25 PROCEDURE — 3074F SYST BP LT 130 MM HG: CPT | Performed by: INTERNAL MEDICINE

## 2024-04-25 PROCEDURE — 3044F HG A1C LEVEL LT 7.0%: CPT | Performed by: INTERNAL MEDICINE

## 2024-04-25 PROCEDURE — 99213 OFFICE O/P EST LOW 20 MIN: CPT | Performed by: INTERNAL MEDICINE

## 2024-04-25 PROCEDURE — 3049F LDL-C 100-129 MG/DL: CPT | Performed by: INTERNAL MEDICINE

## 2024-04-25 PROCEDURE — 11300 SHAVE SKIN LESION 0.5 CM/<: CPT | Performed by: INTERNAL MEDICINE

## 2024-04-25 PROCEDURE — 3078F DIAST BP <80 MM HG: CPT | Performed by: INTERNAL MEDICINE

## 2024-04-25 PROCEDURE — 4010F ACE/ARB THERAPY RXD/TAKEN: CPT | Performed by: INTERNAL MEDICINE

## 2024-04-25 ASSESSMENT — PAIN SCALES - GENERAL: PAINLEVEL: 0-NO PAIN

## 2024-04-25 ASSESSMENT — PATIENT HEALTH QUESTIONNAIRE - PHQ9
SUM OF ALL RESPONSES TO PHQ9 QUESTIONS 1 AND 2: 0
1. LITTLE INTEREST OR PLEASURE IN DOING THINGS: NOT AT ALL
2. FEELING DOWN, DEPRESSED OR HOPELESS: NOT AT ALL

## 2024-04-25 NOTE — PROGRESS NOTES
Patient ID: Patient is here for skin tag removal from his left leg(behind left knee).    Last Office Visit: 4/16/24     Logan Yeager is a 53 y.o. male with PMH remarkable for DM2, HTN, ED, HLD, umbilical hernia  who presents to the office today for Skin Tag (Removal of tag on back of left leg) and Diabetes (A1c on 4/15/24 was 5.6).    Social History     Tobacco Use   • Smoking status: Every Day     Current packs/day: 1.00     Types: Cigarettes     Passive exposure: Current   • Smokeless tobacco: Never   Substance Use Topics   • Alcohol use: Yes     Comment: socially      Review of Systems   Skin:         Skin tag behind left knee       Visit Vitals  /78   Pulse 75   Resp 18   Ht 1.829 m (6')   Wt 138 kg (303 lb 12.8 oz)   SpO2 94%   BMI 41.20 kg/m²   Smoking Status Every Day   BSA 2.65 m²     Allergies   Allergen Reactions   • Codeine Unknown      Physical Exam  Vitals reviewed.   Constitutional:       Appearance: Normal appearance.   HENT:      Head: Normocephalic and atraumatic.   Skin:     Comments: There is a large circular skin tag to left anterior knee   Neurological:      General: No focal deficit present.      Mental Status: He is alert and oriented to person, place, and time.   Psychiatric:         Mood and Affect: Mood normal.         Behavior: Behavior normal.     MEDICATIONS:  Current Outpatient Medications   Medication Instructions   • blood sugar diagnostic (OneTouch Ultra Test) strip TESTING TWICE DAILY   • ergocalciferol (VITAMIN D-2) 50,000 Units, oral, Once Weekly   • FreeStyle glucose monitoring (True Metrix Glucose Meter) kit Testing twice daily   • lisinopril 40 mg, oral, Daily   • metFORMIN XR (GLUCOPHAGE-XR) 750 mg, oral, Daily with evening meal, Do not crush, chew, or split.   • omeprazole (PRILOSEC) 40 mg, oral, Daily before breakfast, Do not crush or chew.   • semaglutide (OZEMPIC) 1 mg, subcutaneous, Every 7 days   • tadalafil (CIALIS) 5 mg, oral, Daily      Lab Results   Component  Value Date    WBC 7.7 04/15/2024    HGB 14.9 04/15/2024    HCT 47.5 04/15/2024     04/15/2024    CHOL 163 04/15/2024    TRIG 155 (H) 04/15/2024    HDL 29.3 04/15/2024    ALT 20 04/15/2024    AST 17 04/15/2024     04/15/2024    K 3.9 04/15/2024     04/15/2024    CREATININE 0.91 04/15/2024    BUN 13 04/15/2024    CO2 26 04/15/2024    TSH 1.32 04/15/2024    PSA 0.42 09/17/2020    HGBA1C 5.6 04/15/2024     ASSESSMENT AND PLAN:  Assessment/Plan   Diagnoses and all orders for this visit: Skin tag removal  Healthcare maintenance  -     Full code    Procedure: Skin tag removal  Informed consent:  Discussed risks (permanent scarring, infection, pain, bleeding, bruising, redness, and recurrence of the lesion) and benefits of the procedure, as well as the alternatives.  He is aware that skin tags are benign lesions, and their removal is often not considered medically necessary.  Informed consent was obtained.  Anesthesia: ethyl oxide spray  The area was prepared and draped in a standard fashion.  Snip removal was performed.    Silver nitrate applied, no bleeding noted  The patient tolerated procedure well.  The patient was instructed on post-op care.    Number of lesions removed:  1   ------  Written by Eloisa Aldrich LPN, acting as a scribe for Dr. Alvarado. This note accurately reflects the work and decisions made by Dr. Alvarado.     I, Dr. Alvarado, attest all medical record entries made by the scribe were under my direction and were personally dictated by me. I have reviewed the chart and agree that the record accurately reflects my performance of the history, physical exam, and assessment and plan.

## 2024-06-02 ENCOUNTER — CLINICAL SUPPORT (OUTPATIENT)
Dept: SLEEP MEDICINE | Facility: CLINIC | Age: 54
End: 2024-06-02
Payer: COMMERCIAL

## 2024-06-02 DIAGNOSIS — R06.83 SNORING: ICD-10-CM

## 2024-06-02 DIAGNOSIS — R53.83 OTHER FATIGUE: ICD-10-CM

## 2024-06-02 DIAGNOSIS — G47.33 OBSTRUCTIVE SLEEP APNEA (ADULT) (PEDIATRIC): ICD-10-CM

## 2024-06-02 PROCEDURE — 95811 POLYSOM 6/>YRS CPAP 4/> PARM: CPT | Performed by: PSYCHIATRY & NEUROLOGY

## 2024-06-03 VITALS
OXYGEN SATURATION: 97 % | DIASTOLIC BLOOD PRESSURE: 79 MMHG | HEART RATE: 56 BPM | RESPIRATION RATE: 14 BRPM | SYSTOLIC BLOOD PRESSURE: 136 MMHG

## 2024-06-03 ASSESSMENT — SLEEP AND FATIGUE QUESTIONNAIRES
HOW LIKELY ARE YOU TO NOD OFF OR FALL ASLEEP IN A CAR, WHILE STOPPED FOR A FEW MINUTES IN TRAFFIC: WOULD NEVER DOZE
HOW LIKELY ARE YOU TO NOD OFF OR FALL ASLEEP WHILE WATCHING TV: MODERATE CHANCE OF DOZING
ESS-CHAD TOTAL SCORE: 12
HOW LIKELY ARE YOU TO NOD OFF OR FALL ASLEEP WHILE SITTING AND READING: MODERATE CHANCE OF DOZING
SITING INACTIVE IN A PUBLIC PLACE LIKE A CLASS ROOM OR A MOVIE THEATER: MODERATE CHANCE OF DOZING
HOW LIKELY ARE YOU TO NOD OFF OR FALL ASLEEP WHILE SITTING AND TALKING TO SOMEONE: SLIGHT CHANCE OF DOZING
HOW LIKELY ARE YOU TO NOD OFF OR FALL ASLEEP WHILE LYING DOWN TO REST IN THE AFTERNOON WHEN CIRCUMSTANCES PERMIT: MODERATE CHANCE OF DOZING
HOW LIKELY ARE YOU TO NOD OFF OR FALL ASLEEP WHILE SITTING QUIETLY AFTER LUNCH WITHOUT ALCOHOL: SLIGHT CHANCE OF DOZING
HOW LIKELY ARE YOU TO NOD OFF OR FALL ASLEEP WHEN YOU ARE A PASSENGER IN A CAR FOR AN HOUR WITHOUT A BREAK: MODERATE CHANCE OF DOZING

## 2024-06-03 NOTE — PROGRESS NOTES
[facility] TECH NOTE:     Patient: Logan Yeager   MRN//AGE: [id number]  1970  53 y.o.   Technologist: Susan Lau RRT-SDS   Room: 106   Service Date: 2024        Sleep Testing Location: [facility]:     TECHNOLOGIST SLEEP STUDY PROCEDURE NOTE:   This sleep study is being conducted according to the policies and procedures outlined by the AAS accreditation standards.  The sleep study procedure and processes involved during this appointment was explained to the patient/patient’s family, questions were answered. The patient/family verbalized understanding.      The patient is a 53 y.o. year old male scheduled for a Diagnostic PSG . he arrived for his appointment.      The study that was ultimately completed was a Diagnostic PSG Split night.    The full study Was completed.  Patient questionnaires completed?: yes     Consents signed? yes    Initial Fall Risk Screening:     Logan has not fallen in the last 6 months. his did not result in injury. Logan does not have a fear of falling. He does not need assistance with sitting, standing, or walking. he does not need assistance walking in his home. he does not need assistance in an unfamiliar setting. The patient is notusing an assistive device.     Brief Study observations: Patient qualified for a Split study tonight. Patient had Arousals of Respiratory, Spontaneous and Limb movements. Patients Respiratory events were Obstructive, Mixed, Hypopneas and Flow Limitations. Patients Respiratory events were positional, when patient was on his back. Patient had intermittent, mild to loud snoring. Patient was up 1 time throughout the night. Patient appeared to have tolerated BIPAP. Patient was put on BIPAP due to having Mixed apneas. Patient did go into REM.     Deviation to order/protocol and reason:       If PAP, which was preferred mask/pressure/mode: Loo & José Vitvictor hugo FFM-Large/ [id number]/ BIPAP      Other:None    After the procedure, the  patient/family was informed to ensure followup with ordering clinician for testing results.      Technologist: Susan Lau, ELOY-SDS

## 2024-06-20 DIAGNOSIS — E11.9 TYPE 2 DIABETES MELLITUS WITHOUT COMPLICATION, WITHOUT LONG-TERM CURRENT USE OF INSULIN (MULTI): ICD-10-CM

## 2024-06-20 DIAGNOSIS — E55.9 VITAMIN D DEFICIENCY: ICD-10-CM

## 2024-06-20 DIAGNOSIS — K21.9 GASTROESOPHAGEAL REFLUX DISEASE, UNSPECIFIED WHETHER ESOPHAGITIS PRESENT: ICD-10-CM

## 2024-06-21 RX ORDER — ERGOCALCIFEROL 1.25 MG/1
50000 CAPSULE ORAL
Qty: 12 CAPSULE | Refills: 1 | Status: SHIPPED | OUTPATIENT
Start: 2024-06-23

## 2024-06-21 RX ORDER — SEMAGLUTIDE 1.34 MG/ML
INJECTION, SOLUTION SUBCUTANEOUS
Qty: 3 ML | Refills: 1 | Status: SHIPPED | OUTPATIENT
Start: 2024-06-21

## 2024-06-21 RX ORDER — OMEPRAZOLE 20 MG/1
40 CAPSULE, DELAYED RELEASE ORAL
Qty: 180 CAPSULE | Refills: 0 | Status: SHIPPED | OUTPATIENT
Start: 2024-06-21

## 2024-06-21 RX ORDER — METFORMIN HYDROCHLORIDE 750 MG/1
TABLET, EXTENDED RELEASE ORAL
Qty: 90 TABLET | Refills: 1 | Status: SHIPPED | OUTPATIENT
Start: 2024-06-21

## 2024-07-07 DIAGNOSIS — E11.9 TYPE 2 DIABETES MELLITUS WITHOUT COMPLICATION, WITHOUT LONG-TERM CURRENT USE OF INSULIN (MULTI): ICD-10-CM

## 2024-07-08 RX ORDER — BLOOD SUGAR DIAGNOSTIC
STRIP MISCELLANEOUS
Qty: 200 STRIP | Refills: 0 | Status: SHIPPED | OUTPATIENT
Start: 2024-07-08

## 2024-07-22 DIAGNOSIS — E11.9 TYPE 2 DIABETES MELLITUS WITHOUT COMPLICATION, WITHOUT LONG-TERM CURRENT USE OF INSULIN (MULTI): ICD-10-CM

## 2024-07-22 DIAGNOSIS — N52.9 ERECTILE DYSFUNCTION, UNSPECIFIED ERECTILE DYSFUNCTION TYPE: Primary | ICD-10-CM

## 2024-07-22 DIAGNOSIS — I10 BENIGN HYPERTENSION: ICD-10-CM

## 2024-07-22 RX ORDER — TADALAFIL 5 MG/1
5 TABLET ORAL DAILY
Qty: 90 TABLET | Refills: 3 | Status: SHIPPED | OUTPATIENT
Start: 2024-07-22 | End: 2025-07-22

## 2024-07-22 RX ORDER — LISINOPRIL 40 MG/1
40 TABLET ORAL DAILY
Qty: 90 TABLET | Refills: 1 | Status: SHIPPED | OUTPATIENT
Start: 2024-07-22

## 2024-07-22 RX ORDER — SEMAGLUTIDE 1.34 MG/ML
1 INJECTION, SOLUTION SUBCUTANEOUS
Qty: 3 ML | Refills: 2 | Status: SHIPPED | OUTPATIENT
Start: 2024-07-22

## 2024-08-10 ENCOUNTER — LAB (OUTPATIENT)
Dept: LAB | Facility: LAB | Age: 54
End: 2024-08-10
Payer: COMMERCIAL

## 2024-08-10 DIAGNOSIS — E55.9 VITAMIN D DEFICIENCY: ICD-10-CM

## 2024-08-10 DIAGNOSIS — E29.1 HYPOGONADISM IN MALE: ICD-10-CM

## 2024-08-10 LAB — 25(OH)D3 SERPL-MCNC: 62 NG/ML (ref 30–100)

## 2024-08-10 PROCEDURE — 82306 VITAMIN D 25 HYDROXY: CPT

## 2024-08-10 PROCEDURE — 84402 ASSAY OF FREE TESTOSTERONE: CPT

## 2024-08-10 PROCEDURE — 36415 COLL VENOUS BLD VENIPUNCTURE: CPT

## 2024-08-13 ENCOUNTER — OFFICE VISIT (OUTPATIENT)
Dept: UROLOGY | Facility: HOSPITAL | Age: 54
End: 2024-08-13
Payer: COMMERCIAL

## 2024-08-13 DIAGNOSIS — N52.9 ERECTILE DYSFUNCTION, UNSPECIFIED ERECTILE DYSFUNCTION TYPE: Primary | ICD-10-CM

## 2024-08-13 PROCEDURE — 4010F ACE/ARB THERAPY RXD/TAKEN: CPT | Performed by: STUDENT IN AN ORGANIZED HEALTH CARE EDUCATION/TRAINING PROGRAM

## 2024-08-13 PROCEDURE — 99214 OFFICE O/P EST MOD 30 MIN: CPT | Performed by: STUDENT IN AN ORGANIZED HEALTH CARE EDUCATION/TRAINING PROGRAM

## 2024-08-13 PROCEDURE — 3044F HG A1C LEVEL LT 7.0%: CPT | Performed by: STUDENT IN AN ORGANIZED HEALTH CARE EDUCATION/TRAINING PROGRAM

## 2024-08-13 PROCEDURE — 3049F LDL-C 100-129 MG/DL: CPT | Performed by: STUDENT IN AN ORGANIZED HEALTH CARE EDUCATION/TRAINING PROGRAM

## 2024-08-13 RX ORDER — TADALAFIL 10 MG/1
10 TABLET ORAL AS NEEDED
Qty: 15 TABLET | Refills: 3 | Status: SHIPPED | OUTPATIENT
Start: 2024-08-13 | End: 2024-09-12

## 2024-08-13 RX ORDER — TADALAFIL 5 MG/1
5 TABLET ORAL DAILY
Qty: 30 TABLET | Refills: 3 | Status: SHIPPED | OUTPATIENT
Start: 2024-08-13 | End: 2024-12-11

## 2024-08-13 RX ORDER — TADALAFIL 20 MG/1
20 TABLET ORAL DAILY PRN
Qty: 12 TABLET | Refills: 3 | Status: SHIPPED | OUTPATIENT
Start: 2024-08-13 | End: 2024-08-13 | Stop reason: WASHOUT

## 2024-08-13 NOTE — PROGRESS NOTES
Subjective   Patient ID: Logan Yeager is a 54 y.o. male    HPI  54 y.o. male who presents for a follow-up visit regarding his erectile dysfunction. He has been taking 5 mg of medication every morning. He reports an improvement in the quality of his erections and feels satisfied with the current treatment. However, he is open to trying a higher dose for better results. The patient has no history of heart problems. The plan is to continue with the 5 mg daily and add an additional 5 mg or 10 mg as needed, one to two hours before sexual activity, on an empty stomach. A virtual follow-up visit is scheduled in one month to review the results of his testosterone levels.    The most recent PSA, conducted on 7/15/2024, revealed 0.33 ng/ml     Review of Systems    All systems were reviewed. Anything negative was noted in the HPI.    Objective   Physical Exam    General: Well developed, well nourished, alert and cooperative, appears in no acute distress   Eyes: Non-injected conjunctiva, sclera clear, no proptosis   Cardiac: Extremities are warm and well perfused. No edema, cyanosis or pallor   Lungs: Breathing is easy, non-labored. Speaking in clear and complete sentences. Normal diaphragmatic movement   MSK: Ambulatory with steady gait, unassisted   Neuro: Alert and oriented to person, place, and time   Psych: Demonstrates good judgment and reason, without hallucinations, abnormal affect or abnormal behaviors   Skin: No obvious lesions, no rashes       No CVA tenderness bilaterally   No suprapubic pain or discomfort       Past Medical History:   Diagnosis Date    Impacted cerumen of right ear 07/13/2023    Morbid (severe) obesity due to excess calories (Multi) 03/29/2016    Morbid obesity    Morbid (severe) obesity due to excess calories (Multi) 03/29/2016    Morbid obesity    Personal history of other endocrine, nutritional and metabolic disease     History of obesity         No past surgical history on  file.      Assessment/Plan   Erectile Dysfunction    54 y.o. male who presents for the above condition, We had a very long and extensive discussion with the patient regarding the pathophysiology, differential diagnosis, risk factor, and management of ED. We discussed at length mechanism of action, risk, benefit, potential complication, adverse events of PDE 5 inhibitors in the form of Tadalafil 5 mg daily and 10 mg as needed. Instructed the patient to stop the medication in case of any side effects.    1. Continue with 5 mg of Tadalafil every morning.  2. Add an additional 5 mg or 10 mg as needed, one to two hours before sexual activity, on an empty stomach.  3. Virtual follow-up visit in one month to review testosterone levels.      Plan:  - Follow up virtually in 1 months with Testosterone free and total  - Add tadalafil 10mg PRN on top of Tadalafil 5mg daily           Scribe Attestation  By signing my name below, I, Marty Denise, [place]   attest that this documentation has been prepared under the direction and in the presence of Dr. Crispin Tinoco

## 2024-08-16 ENCOUNTER — APPOINTMENT (OUTPATIENT)
Dept: PRIMARY CARE | Facility: CLINIC | Age: 54
End: 2024-08-16
Payer: COMMERCIAL

## 2024-08-16 LAB
TESTOSTERONE FREE (CHAN): 38.6 PG/ML (ref 35–155)
TESTOSTERONE,TOTAL,LC-MS/MS: 270 NG/DL (ref 250–1100)

## 2024-08-19 ASSESSMENT — ENCOUNTER SYMPTOMS
FATIGUE: 1
DIARRHEA: 1

## 2024-08-20 ENCOUNTER — APPOINTMENT (OUTPATIENT)
Dept: PRIMARY CARE | Facility: CLINIC | Age: 54
End: 2024-08-20
Payer: COMMERCIAL

## 2024-08-20 VITALS
RESPIRATION RATE: 18 BRPM | DIASTOLIC BLOOD PRESSURE: 82 MMHG | OXYGEN SATURATION: 95 % | SYSTOLIC BLOOD PRESSURE: 134 MMHG | HEIGHT: 72 IN | BODY MASS INDEX: 39.14 KG/M2 | HEART RATE: 56 BPM | WEIGHT: 289 LBS

## 2024-08-20 DIAGNOSIS — K21.9 GASTROESOPHAGEAL REFLUX DISEASE WITHOUT ESOPHAGITIS: ICD-10-CM

## 2024-08-20 DIAGNOSIS — Z00.00 HEALTH CARE MAINTENANCE: ICD-10-CM

## 2024-08-20 DIAGNOSIS — N52.9 ERECTILE DYSFUNCTION, UNSPECIFIED ERECTILE DYSFUNCTION TYPE: ICD-10-CM

## 2024-08-20 DIAGNOSIS — I10 BENIGN HYPERTENSION: ICD-10-CM

## 2024-08-20 DIAGNOSIS — F17.200 TOBACCO DEPENDENCE: ICD-10-CM

## 2024-08-20 DIAGNOSIS — G47.33 OSA ON CPAP: ICD-10-CM

## 2024-08-20 DIAGNOSIS — Z12.11 COLON CANCER SCREENING: ICD-10-CM

## 2024-08-20 DIAGNOSIS — E11.9 TYPE 2 DIABETES MELLITUS WITHOUT COMPLICATION, WITHOUT LONG-TERM CURRENT USE OF INSULIN (MULTI): ICD-10-CM

## 2024-08-20 PROBLEM — Z53.20 COLON CANCER SCREENING DECLINED: Status: ACTIVE | Noted: 2024-08-20

## 2024-08-20 LAB — POC HEMOGLOBIN A1C: 5.9 % (ref 4.2–6.5)

## 2024-08-20 PROCEDURE — 99214 OFFICE O/P EST MOD 30 MIN: CPT | Performed by: INTERNAL MEDICINE

## 2024-08-20 PROCEDURE — 4010F ACE/ARB THERAPY RXD/TAKEN: CPT | Performed by: INTERNAL MEDICINE

## 2024-08-20 PROCEDURE — 3044F HG A1C LEVEL LT 7.0%: CPT | Performed by: INTERNAL MEDICINE

## 2024-08-20 PROCEDURE — 83036 HEMOGLOBIN GLYCOSYLATED A1C: CPT | Performed by: INTERNAL MEDICINE

## 2024-08-20 PROCEDURE — 3079F DIAST BP 80-89 MM HG: CPT | Performed by: INTERNAL MEDICINE

## 2024-08-20 PROCEDURE — 3008F BODY MASS INDEX DOCD: CPT | Performed by: INTERNAL MEDICINE

## 2024-08-20 PROCEDURE — 3075F SYST BP GE 130 - 139MM HG: CPT | Performed by: INTERNAL MEDICINE

## 2024-08-20 PROCEDURE — 3049F LDL-C 100-129 MG/DL: CPT | Performed by: INTERNAL MEDICINE

## 2024-08-20 ASSESSMENT — PAIN SCALES - GENERAL: PAINLEVEL: 0-NO PAIN

## 2024-08-20 ASSESSMENT — PATIENT HEALTH QUESTIONNAIRE - PHQ9
1. LITTLE INTEREST OR PLEASURE IN DOING THINGS: NOT AT ALL
2. FEELING DOWN, DEPRESSED OR HOPELESS: NOT AT ALL
SUM OF ALL RESPONSES TO PHQ9 QUESTIONS 1 AND 2: 0

## 2024-08-20 NOTE — PROGRESS NOTES
Patient ID:   Logan Yeager is a 54 y.o. male with PMH remarkable for YAHAIRA on Cpap, DM2, HTN, ED, HLD, umbilical hernia who presents to the office today for Follow-up.    HEALTH MAINTENANCE: Follow Up  Last Office Visit: 1/11/2024  Smoking: Current Smoker  Labs: 7/15/2023 -> 4/15/2024  HgbA1c 6.6 on 7/15/2023 -> 7.4 -> 5.6 on 4/15/2024 -> DUE 5.9 today  PSA: 0.33 on 7/15/2023 -> 0.78 on 4/15/2024  Colonoscopy (45-75): Agree to cologuard.  Lung cancer screening (55-80 + 30 pack year + smoking/quit in last 15 years): never had a dedicated scan of lungs. Agreeable.  6/2/2024 sleep study showed severe YAHAIRA with Sp02 58%.  - pt has been using the cpap every night and is compliant with it.   - stated that he removes it in his sleep at times.  - in 2019 CT a/p that was obtained due hernia. It did note mild diffuse fatty infiltration of liver. Noted diverticulosis.   - took PO lamisil x3 months. Followed by Dr Villafana.   - down 41# since 7/2023.    Social History     Tobacco Use    Smoking status: Every Day     Current packs/day: 1.00     Types: Cigarettes     Passive exposure: Current    Smokeless tobacco: Never   Substance Use Topics    Alcohol use: Yes     Comment: socially    Drug use: Never     Review of Systems   Constitutional:  Positive for fatigue.   Gastrointestinal:  Positive for diarrhea.   All other systems reviewed and are negative.    Visit Vitals  /82 (BP Location: Left arm, Patient Position: Sitting)   Pulse 56   Resp 18   Ht 1.829 m (6')   Wt 131 kg (289 lb)   SpO2 95%   BMI 39.20 kg/m²   Smoking Status Every Day   BSA 2.58 m²   7/2023 330# --> 289# today = 41# today    Physical Exam  Vitals reviewed.   Constitutional:       General: He is not in acute distress.     Appearance: Normal appearance. He is not ill-appearing.   HENT:      Head: Normocephalic and atraumatic.      Right Ear: Tympanic membrane and external ear normal.      Left Ear: Tympanic membrane and external ear normal.      Nose: Nose  normal.      Mouth/Throat:      Mouth: Mucous membranes are moist.      Pharynx: Oropharynx is clear.   Eyes:      Conjunctiva/sclera: Conjunctivae normal.      Pupils: Pupils are equal, round, and reactive to light.   Cardiovascular:      Rate and Rhythm: Normal rate and regular rhythm.      Heart sounds: Normal heart sounds. No murmur heard.  Pulmonary:      Effort: Pulmonary effort is normal. No respiratory distress.      Breath sounds: Decreased breath sounds present. No wheezing.   Abdominal:      General: Abdomen is protuberant. Bowel sounds are normal. There is distension.      Palpations: Abdomen is soft. There is no mass.      Tenderness: There is no abdominal tenderness.   Musculoskeletal:         General: Normal range of motion.      Cervical back: Normal range of motion and neck supple.   Skin:     General: Skin is warm and dry.   Neurological:      General: No focal deficit present.      Mental Status: He is alert and oriented to person, place, and time.      Sensory: No sensory deficit.      Motor: No weakness.      Coordination: Coordination normal.      Gait: Gait normal.   Psychiatric:         Mood and Affect: Mood normal.         Behavior: Behavior normal.       Current Outpatient Medications   Medication Instructions    FreeStyle glucose monitoring (True Metrix Glucose Meter) kit Testing twice daily    lisinopril 40 mg, oral, Daily    metFORMIN XR (Glucophage-XR) 750 mg 24 hr tablet TAKE 1 TABLET (750 MG) BY MOUTH ONCE DAILY IN THE EVENING. TAKE WITH MEALS    omeprazole (PRILOSEC) 40 mg, oral, Daily before breakfast, Do not crush or chew.    OneTouch Ultra Test strip TESTING TWICE DAILY    semaglutide 2 mg, subcutaneous, Every 7 days, CALL FOR REFILL    tadalafil (CIALIS) 5 mg, oral, Daily    tadalafil (CIALIS) 10 mg, oral, As needed      Lab Results   Component Value Date    WBC 7.7 04/15/2024    HGB 14.9 04/15/2024    HCT 47.5 04/15/2024     04/15/2024    CHOL 163 04/15/2024    TRIG 155  (H) 04/15/2024    HDL 29.3 04/15/2024    ALT 20 04/15/2024    AST 17 04/15/2024     04/15/2024    K 3.9 04/15/2024     04/15/2024    CREATININE 0.91 04/15/2024    BUN 13 04/15/2024    CO2 26 04/15/2024    TSH 1.32 04/15/2024    PSA 0.42 09/17/2020    HGBA1C 5.9 08/20/2024     Assessment/Plan   Problem List Items Addressed This Visit             ICD-10-CM    Benign hypertension I10     - c/w lisinopril 40 QD         ED (erectile dysfunction) N52.9     - c/w cialis PRN         Diabetes mellitus (Multi) E11.9     - HgbA1c 5.9 today  - c/w ozempic weekly but INCREASE to 2mg, metformin XR 750mg every day  - discussed about trying align probiotic with bloating relief for side effects         Relevant Medications    semaglutide 2 mg/dose (8 mg/3 mL) pen injector    Other Relevant Orders    POCT glycosylated hemoglobin (Hb A1C) manually resulted (Completed)    Tobacco dependence F17.200     - will order CT lung scan to assess for nodules         Relevant Orders    CT lung screening low dose    Health care maintenance Z00.00    Gastroesophageal reflux disease K21.9     - c/w omeprazole 40mg          YAHAIRA on CPAP G47.33     - recent started on cpap after sleep study in June  - tolerating well          Other Visit Diagnoses         Codes    Colon cancer screening     Z12.11    Relevant Orders    Cologuard® colon cancer screening             --------------------  Written by Rose Marie Moody RN, acting as a scribe for Dr. Alvarado. This note accurately reflects the work and decisions made by Dr. Alvarado.     I, Dr. Alvarado, attest all medical record entries made by the scribe were under my direction and were personally dictated by me. I have reviewed the chart and agree that the record accurately reflects my performance of the history, physical exam, and assessment and plan.

## 2024-08-20 NOTE — ASSESSMENT & PLAN NOTE
- HgbA1c 5.9 today  - c/w ozempic weekly but INCREASE to 2mg, metformin XR 750mg every day  - discussed about trying align probiotic with bloating relief for side effects

## 2024-08-29 LAB — NONINV COLON CA DNA+OCC BLD SCRN STL QL: NEGATIVE

## 2024-09-11 ASSESSMENT — ENCOUNTER SYMPTOMS
DIARRHEA: 1
FATIGUE: 1

## 2024-09-11 NOTE — PROGRESS NOTES
Patient ID:   Logan Yeager is a 54 y.o. male with PMH remarkable for YAHAIRA on Cpap, DM2, HTN, ED, HLD, umbilical hernia who presents to the office today for Annual Exam.    HEALTH MAINTENANCE: Annual Wellness Physical  Last Office Visit: 8/20/2024 for FU. Cologuard was ordered. A1c was 5.9. CT low dose of lung was ordered as well. Semaglutide increased to 2mg weekly. Advised to try align probiotic for bloating.  Smoking: Current Smoker  Labs: 7/15/2023 -> 4/15/2024  HgbA1c 6.6 on 7/15/2023 -> 7.4 -> 5.6 on 4/15/2024 -> 5.9 on 8/20/2024  PSA: 0.33 on 7/15/2023 -> 0.78 on 4/15/2024  Colonoscopy (45-75): Cologuard -ve 8/23/2024.  Lung cancer screening (55-80 + 30 pack year + smoking/quit in last 15 years): Scheduled for 9/14/2024 at [place].  6/2/2024 sleep study showed severe YAHAIRA with Sp02 58%.  - pt has been using the cpap every night and is compliant with it.   - took PO lamisil x3 months. Followed by Dr Villafana.   - down 49# since 7/2023.    Physical Questions:  Pain scale: 2  Living will? No  POA? No  Are you currently or have you recently been threatened or abused? No  Do you feel unsafe going back to the place you are living? No  Reported health: Good  Dental visits? No  Hearing problems? No  Vision problems? Yes - wears glasses  Healthy diet? No  Exercise? Exercise 4-5x per week  Adequate fluid intake? Yes    Social History     Tobacco Use    Smoking status: Every Day     Current packs/day: 1.00     Types: Cigarettes     Passive exposure: Current    Smokeless tobacco: Never   Substance Use Topics    Alcohol use: Yes     Comment: socially    Drug use: Never     Review of Systems   Constitutional:  Positive for fatigue.   Gastrointestinal:  Positive for diarrhea.   All other systems reviewed and are negative.    Visit Vitals  /66 (BP Location: Left arm, Patient Position: Sitting)   Pulse 66   Resp 18   Ht 1.829 m (6')   Wt 127 kg (281 lb)   SpO2 93%   BMI 38.11 kg/m²   Smoking Status Every Day   BSA 2.54 m²    7/2023: 330# --> 289# --> 281# today    Physical Exam  Vitals reviewed.   Constitutional:       General: He is not in acute distress.     Appearance: Normal appearance. He is not ill-appearing.   HENT:      Head: Normocephalic and atraumatic.      Right Ear: Tympanic membrane and external ear normal.      Left Ear: Tympanic membrane and external ear normal.      Nose: Nose normal.      Mouth/Throat:      Mouth: Mucous membranes are moist.      Pharynx: Oropharynx is clear.   Eyes:      Conjunctiva/sclera: Conjunctivae normal.      Pupils: Pupils are equal, round, and reactive to light.   Cardiovascular:      Rate and Rhythm: Normal rate and regular rhythm.      Heart sounds: Normal heart sounds. No murmur heard.  Pulmonary:      Effort: Pulmonary effort is normal. No respiratory distress.      Breath sounds: Decreased breath sounds present. No wheezing.   Abdominal:      General: Abdomen is protuberant. Bowel sounds are normal. There is distension.      Palpations: Abdomen is soft. There is no mass.      Tenderness: There is no abdominal tenderness.   Musculoskeletal:         General: Normal range of motion.      Cervical back: Normal range of motion and neck supple.   Skin:     General: Skin is warm and dry.   Neurological:      General: No focal deficit present.      Mental Status: He is alert and oriented to person, place, and time.      Sensory: No sensory deficit.      Motor: No weakness.      Coordination: Coordination normal.      Gait: Gait normal.   Psychiatric:         Mood and Affect: Mood normal.         Behavior: Behavior normal.       Current Outpatient Medications   Medication Instructions    FreeStyle glucose monitoring (True Metrix Glucose Meter) kit Testing twice daily    lisinopril 40 mg, oral, Daily    metFORMIN XR (Glucophage-XR) 750 mg 24 hr tablet TAKE 1 TABLET (750 MG) BY MOUTH ONCE DAILY IN THE EVENING. TAKE WITH MEALS    omeprazole (PRILOSEC) 40 mg, oral, Daily before breakfast, Do not  crush or chew.    OneTouch Ultra Test strip TESTING TWICE DAILY    semaglutide 2 mg, subcutaneous, Every 7 days, CALL FOR REFILL    tadalafil (CIALIS) 5 mg, oral, Daily    tadalafil (CIALIS) 10 mg, oral, As needed      Lab Results   Component Value Date    WBC 7.7 04/15/2024    HGB 14.9 04/15/2024    HCT 47.5 04/15/2024     04/15/2024    CHOL 163 04/15/2024    TRIG 155 (H) 04/15/2024    HDL 29.3 04/15/2024    ALT 20 04/15/2024    AST 17 04/15/2024     04/15/2024    K 3.9 04/15/2024     04/15/2024    CREATININE 0.91 04/15/2024    BUN 13 04/15/2024    CO2 26 04/15/2024    TSH 1.32 04/15/2024    PSA 0.42 09/17/2020    HGBA1C 5.9 08/20/2024     Assessment/Plan   Problem List Items Addressed This Visit             ICD-10-CM    Benign hypertension I10     - c/w lisinopril 40 QD         Diabetes mellitus (Multi) E11.9     - HgbA1c 5.9   - c/w ozempic 2mg weekly, metformin XR 750mg every day  - discussed about trying align probiotic with bloating relief for side effects         Tobacco dependence F17.200     - CT lung scan to assess for nodules scheduled for tomorrow         Encounter for annual physical exam Z00.00    YAHAIRA on CPAP G47.33     - recent started on cpap after sleep study in June  - tolerating well               --------------------  Written by Rose Marie Moody, RN, acting as a scribe for Dr. Alvarado. This note accurately reflects the work and decisions made by Dr. Alvarado.     I, Dr. Alvarado, attest all medical record entries made by the scribe were under my direction and were personally dictated by me. I have reviewed the chart and agree that the record accurately reflects my performance of the history, physical exam, and assessment and plan.

## 2024-09-13 ENCOUNTER — APPOINTMENT (OUTPATIENT)
Dept: PRIMARY CARE | Facility: CLINIC | Age: 54
End: 2024-09-13
Payer: COMMERCIAL

## 2024-09-13 VITALS
BODY MASS INDEX: 38.06 KG/M2 | RESPIRATION RATE: 18 BRPM | SYSTOLIC BLOOD PRESSURE: 103 MMHG | HEART RATE: 66 BPM | DIASTOLIC BLOOD PRESSURE: 66 MMHG | WEIGHT: 281 LBS | HEIGHT: 72 IN | OXYGEN SATURATION: 93 %

## 2024-09-13 DIAGNOSIS — G47.33 OSA ON CPAP: ICD-10-CM

## 2024-09-13 DIAGNOSIS — F17.200 TOBACCO DEPENDENCE: ICD-10-CM

## 2024-09-13 DIAGNOSIS — Z00.00 ENCOUNTER FOR ANNUAL PHYSICAL EXAM: ICD-10-CM

## 2024-09-13 DIAGNOSIS — E11.9 TYPE 2 DIABETES MELLITUS WITHOUT COMPLICATION, WITHOUT LONG-TERM CURRENT USE OF INSULIN (MULTI): ICD-10-CM

## 2024-09-13 DIAGNOSIS — I10 BENIGN HYPERTENSION: ICD-10-CM

## 2024-09-13 PROCEDURE — 4010F ACE/ARB THERAPY RXD/TAKEN: CPT | Performed by: INTERNAL MEDICINE

## 2024-09-13 PROCEDURE — 3044F HG A1C LEVEL LT 7.0%: CPT | Performed by: INTERNAL MEDICINE

## 2024-09-13 PROCEDURE — 3078F DIAST BP <80 MM HG: CPT | Performed by: INTERNAL MEDICINE

## 2024-09-13 PROCEDURE — 3008F BODY MASS INDEX DOCD: CPT | Performed by: INTERNAL MEDICINE

## 2024-09-13 PROCEDURE — 99396 PREV VISIT EST AGE 40-64: CPT | Performed by: INTERNAL MEDICINE

## 2024-09-13 PROCEDURE — 3049F LDL-C 100-129 MG/DL: CPT | Performed by: INTERNAL MEDICINE

## 2024-09-13 PROCEDURE — 3074F SYST BP LT 130 MM HG: CPT | Performed by: INTERNAL MEDICINE

## 2024-09-13 ASSESSMENT — PAIN SCALES - GENERAL: PAINLEVEL: 2

## 2024-09-13 NOTE — PROGRESS NOTES
Subjective   Patient ID:   Logan Yeager is a 54 y.o. male who presents for Annual Exam.  HPI  Pain scale:   Living will? No  POA? No  Are you currently or have you recently been threatened or abused? No  Do you feel unsafe going back to the place you are living? No  Reported health: Good  Dental visits? No  Hearing problems? No  Vision problems? Yes - wears glasses  Healthy diet? No  Exercise? Exercise 4-5x per week  Adequate fluid intake? Yes    Social History     Tobacco Use    Smoking status: Every Day     Current packs/day: 1.00     Types: Cigarettes     Passive exposure: Current    Smokeless tobacco: Never   Substance Use Topics    Alcohol use: Yes     Comment: socially    Drug use: Never       Immunization History   Administered Date(s) Administered    Flu vaccine (IIV4), preservative free *Check age/dose* 10/10/2022    Influenza, seasonal, injectable 10/08/2021    Pfizer COVID-19 vaccine, 12 years and older, (30mcg/0.3mL) (Spikevax) 10/07/2023, 09/03/2024    Pfizer COVID-19 vaccine, bivalent, age 12 years and older (30 mcg/0.3 mL) 10/10/2022    Pfizer Purple Cap SARS-CoV-2 03/20/2021, 04/10/2021, 10/22/2021, 10/10/2022    Polio, Unspecified 10/17/1978       Review of Systems  12 point review of systems negative unless stated above in HPI    There were no vitals filed for this visit.    Physical Exam  General: Alert and oriented, well nourished, no acute distress.  Lungs: Clear to auscultation, non-labored respiration.  Heart: Normal rate, regular rhythm, no murmur, gallop or edema.  Neurologic: Awake, alert, and oriented X3, CN II-XII intact.  Psychiatric: Cooperative, appropriate mood and affect.    Assessment/Plan

## 2024-09-13 NOTE — ASSESSMENT & PLAN NOTE
- HgbA1c 5.9   - c/w ozempic 2mg weekly, metformin XR 750mg every day  - discussed about trying align probiotic with bloating relief for side effects

## 2024-09-14 ENCOUNTER — HOSPITAL ENCOUNTER (OUTPATIENT)
Dept: RADIOLOGY | Facility: HOSPITAL | Age: 54
Discharge: HOME | End: 2024-09-14
Payer: COMMERCIAL

## 2024-09-14 DIAGNOSIS — F17.200 TOBACCO DEPENDENCE: ICD-10-CM

## 2024-09-14 PROCEDURE — 71271 CT THORAX LUNG CANCER SCR C-: CPT

## 2024-09-15 ENCOUNTER — APPOINTMENT (OUTPATIENT)
Dept: RADIOLOGY | Facility: HOSPITAL | Age: 54
End: 2024-09-15
Payer: COMMERCIAL

## 2024-09-16 DIAGNOSIS — R93.1 ELEVATED CORONARY ARTERY CALCIUM SCORE: ICD-10-CM

## 2024-09-16 DIAGNOSIS — R76.8 VOLTAGE-GATED CALCIUM CHANNEL ANTIBODY POSITIVE: ICD-10-CM

## 2024-09-16 DIAGNOSIS — N28.89 RENAL MASS: ICD-10-CM

## 2024-09-16 DIAGNOSIS — N28.89 RIGHT KIDNEY MASS: Primary | ICD-10-CM

## 2024-09-17 ENCOUNTER — TELEMEDICINE (OUTPATIENT)
Dept: UROLOGY | Facility: HOSPITAL | Age: 54
End: 2024-09-17
Payer: COMMERCIAL

## 2024-09-17 DIAGNOSIS — E29.1 HYPOGONADISM IN MALE: ICD-10-CM

## 2024-09-17 DIAGNOSIS — N52.9 ERECTILE DYSFUNCTION, UNSPECIFIED ERECTILE DYSFUNCTION TYPE: Primary | ICD-10-CM

## 2024-09-17 PROCEDURE — 3049F LDL-C 100-129 MG/DL: CPT | Performed by: STUDENT IN AN ORGANIZED HEALTH CARE EDUCATION/TRAINING PROGRAM

## 2024-09-17 PROCEDURE — 3044F HG A1C LEVEL LT 7.0%: CPT | Performed by: STUDENT IN AN ORGANIZED HEALTH CARE EDUCATION/TRAINING PROGRAM

## 2024-09-17 PROCEDURE — 99214 OFFICE O/P EST MOD 30 MIN: CPT | Performed by: STUDENT IN AN ORGANIZED HEALTH CARE EDUCATION/TRAINING PROGRAM

## 2024-09-17 PROCEDURE — 4010F ACE/ARB THERAPY RXD/TAKEN: CPT | Performed by: STUDENT IN AN ORGANIZED HEALTH CARE EDUCATION/TRAINING PROGRAM

## 2024-09-17 NOTE — PROGRESS NOTES
Subjective   Patient ID: Logan Yeager is a 54 y.o. male    HPI  Today's visit was done virtually after appropriate consent from the patient.    54 y.o. male who presents for a follow-up visit regarding his erectile dysfunction. He has been taking 5 mg of medication every morning. He reports an improvement in the quality of his erections and feels satisfied with the current treatment. However, he is open to trying a higher dose for better results. The patient has no history of heart problems. The plan is to continue with the 5 mg daily and add an additional 5 mg or 10 mg as needed, one to two hours before sexual activity, on an empty stomach. A virtual follow-up visit is scheduled in one month to review the results of his testosterone levels.    The most recent PSA, conducted on 7/15/2024, revealed 0.33 ng/ml     The most recent Total testosterone, conducted on 08/10/2024, revealed:  270 ng/dl      Review of Systems    All systems were reviewed. Anything negative was noted in the HPI.    Objective   Physical Exam    General: Well developed, well nourished, alert and cooperative, appears in no acute distress   Eyes: Non-injected conjunctiva, sclera clear, no proptosis   Cardiac: Extremities are warm and well perfused. No edema, cyanosis or pallor   Lungs: Breathing is easy, non-labored. Speaking in clear and complete sentences. Normal diaphragmatic movement   MSK: Ambulatory with steady gait, unassisted   Neuro: Alert and oriented to person, place, and time   Psych: Demonstrates good judgment and reason, without hallucinations, abnormal affect or abnormal behaviors   Skin: No obvious lesions, no rashes       No CVA tenderness bilaterally   No suprapubic pain or discomfort       Past Medical History:   Diagnosis Date    Impacted cerumen of right ear 07/13/2023    Morbid (severe) obesity due to excess calories (Multi) 03/29/2016    Morbid obesity    Morbid (severe) obesity due to excess calories (Multi) 03/29/2016     Morbid obesity    Personal history of other endocrine, nutritional and metabolic disease     History of obesity         No past surgical history on file.      Assessment/Plan   Erectile Dysfunction    54 y.o. male who presents for the above condition, We had a very long and extensive discussion with the patient regarding the pathophysiology, differential diagnosis, risk factor, and management of ED. We discussed at length mechanism of action, risk, benefit, potential complication, adverse events of PDE 5 inhibitors in the form of Tadalafil 5 mg daily and 10 mg as needed. Instructed the patient to stop the medication in case of any side effects.    Continue current management without initiating testosterone therapy. Increase tadalafil dose to 15 mg as needed, taken half an hour before intercourse. Repeat testosterone level in six months to monitor trends. If levels drop below 240-250 ng/dL, consider testosterone therapy at that time.    Plan:  - Follow up in 6 months with Testosterone free and total          Scribe Attestation  By signing my name below, IMarty Scrolga   attest that this documentation has been prepared under the direction and in the presence of Dr. Crispin Tinoco

## 2024-09-24 ENCOUNTER — HOSPITAL ENCOUNTER (OUTPATIENT)
Dept: RADIOLOGY | Facility: HOSPITAL | Age: 54
Discharge: HOME | End: 2024-09-24
Payer: COMMERCIAL

## 2024-09-24 DIAGNOSIS — N28.89 RIGHT KIDNEY MASS: ICD-10-CM

## 2024-09-24 PROCEDURE — 76770 US EXAM ABDO BACK WALL COMP: CPT | Performed by: RADIOLOGY

## 2024-09-24 PROCEDURE — 76770 US EXAM ABDO BACK WALL COMP: CPT

## 2024-09-26 DIAGNOSIS — N28.9 RENAL LESION: Primary | ICD-10-CM

## 2024-10-10 ENCOUNTER — OFFICE VISIT (OUTPATIENT)
Dept: CARDIOLOGY | Facility: CLINIC | Age: 54
End: 2024-10-10
Payer: COMMERCIAL

## 2024-10-10 VITALS
HEART RATE: 58 BPM | HEIGHT: 72 IN | RESPIRATION RATE: 16 BRPM | BODY MASS INDEX: 37.52 KG/M2 | OXYGEN SATURATION: 96 % | TEMPERATURE: 98 F | WEIGHT: 277 LBS | DIASTOLIC BLOOD PRESSURE: 86 MMHG | SYSTOLIC BLOOD PRESSURE: 140 MMHG

## 2024-10-10 DIAGNOSIS — I10 BENIGN HYPERTENSION: ICD-10-CM

## 2024-10-10 DIAGNOSIS — E78.1 HYPERTRIGLYCERIDEMIA: ICD-10-CM

## 2024-10-10 DIAGNOSIS — I25.10 ASHD (ARTERIOSCLEROTIC HEART DISEASE): Primary | ICD-10-CM

## 2024-10-10 DIAGNOSIS — E78.00 HYPERCHOLESTEREMIA: ICD-10-CM

## 2024-10-10 DIAGNOSIS — K21.9 GASTROESOPHAGEAL REFLUX DISEASE, UNSPECIFIED WHETHER ESOPHAGITIS PRESENT: ICD-10-CM

## 2024-10-10 DIAGNOSIS — R93.1 ELEVATED CORONARY ARTERY CALCIUM SCORE: ICD-10-CM

## 2024-10-10 PROCEDURE — 4010F ACE/ARB THERAPY RXD/TAKEN: CPT | Performed by: INTERNAL MEDICINE

## 2024-10-10 PROCEDURE — 99203 OFFICE O/P NEW LOW 30 MIN: CPT | Performed by: INTERNAL MEDICINE

## 2024-10-10 PROCEDURE — 3049F LDL-C 100-129 MG/DL: CPT | Performed by: INTERNAL MEDICINE

## 2024-10-10 PROCEDURE — 93005 ELECTROCARDIOGRAM TRACING: CPT | Performed by: INTERNAL MEDICINE

## 2024-10-10 PROCEDURE — 3044F HG A1C LEVEL LT 7.0%: CPT | Performed by: INTERNAL MEDICINE

## 2024-10-10 PROCEDURE — 99213 OFFICE O/P EST LOW 20 MIN: CPT | Performed by: INTERNAL MEDICINE

## 2024-10-10 PROCEDURE — 3077F SYST BP >= 140 MM HG: CPT | Performed by: INTERNAL MEDICINE

## 2024-10-10 PROCEDURE — 3079F DIAST BP 80-89 MM HG: CPT | Performed by: INTERNAL MEDICINE

## 2024-10-10 PROCEDURE — 93010 ELECTROCARDIOGRAM REPORT: CPT | Performed by: INTERNAL MEDICINE

## 2024-10-10 PROCEDURE — 3008F BODY MASS INDEX DOCD: CPT | Performed by: INTERNAL MEDICINE

## 2024-10-10 RX ORDER — ROSUVASTATIN CALCIUM 20 MG/1
20 TABLET, COATED ORAL DAILY
Qty: 90 TABLET | Refills: 3 | Status: SHIPPED | OUTPATIENT
Start: 2024-10-10 | End: 2025-10-10

## 2024-10-10 ASSESSMENT — LIFESTYLE VARIABLES
HOW OFTEN DO YOU HAVE SIX OR MORE DRINKS ON ONE OCCASION: NEVER
AUDIT TOTAL SCORE: 1
HOW OFTEN DO YOU HAVE A DRINK CONTAINING ALCOHOL: MONTHLY OR LESS
AUDIT-C TOTAL SCORE: 1
HAVE YOU OR SOMEONE ELSE BEEN INJURED AS A RESULT OF YOUR DRINKING: NO
HOW MANY STANDARD DRINKS CONTAINING ALCOHOL DO YOU HAVE ON A TYPICAL DAY: 1 OR 2
HAS A RELATIVE, FRIEND, DOCTOR, OR ANOTHER HEALTH PROFESSIONAL EXPRESSED CONCERN ABOUT YOUR DRINKING OR SUGGESTED YOU CUT DOWN: NO
SKIP TO QUESTIONS 9-10: 1

## 2024-10-10 ASSESSMENT — ENCOUNTER SYMPTOMS
RESPIRATORY NEGATIVE: 1
GASTROINTESTINAL NEGATIVE: 1
FEVER: 0
BACK PAIN: 0
FALLS: 0
CHILLS: 0
IRREGULAR HEARTBEAT: 0
EYES NEGATIVE: 1
COUGH: 0
MUSCULOSKELETAL NEGATIVE: 1
CONSTITUTIONAL NEGATIVE: 1
DEPRESSION: 0
OCCASIONAL FEELINGS OF UNSTEADINESS: 0
LOSS OF SENSATION IN FEET: 0
NEUROLOGICAL NEGATIVE: 1
NEAR-SYNCOPE: 0
ENDOCRINE NEGATIVE: 1
DYSPNEA ON EXERTION: 0

## 2024-10-10 ASSESSMENT — PAIN SCALES - GENERAL: PAINLEVEL: 0-NO PAIN

## 2024-10-10 NOTE — PROGRESS NOTES
Subjective      Chief Complaint   Patient presents with    Establish Care     Mr. Yeager  is present to establish relationship with Dr. Loya  for Cardiac Eval and Treat after referral from Dr. Banuelos           This is a 54-year-old white male resting see because of high cardiac calcium score 400.  He does have history of smoking history of hypertension diabetes mellitus hypercholesterolemia and a family history of coronary artery disease.  He is not complaining of chest pain chest pressures or discomforts.  He is not complain of palpitations PND orthopnea.  His legs are now swelling up on him.  Otherwise remains very active.  In April of this year he had his cholesterol checked and was 163 with the HDL at 29 and the LDL of 103.  He was to start on Crestor 10 mg has not done this yet.  She never had rheumatic fever nor heart murmur.  EKG was done showing a normal sinus rhythm.           Review of Systems   Constitutional: Negative. Negative for chills and fever.   HENT: Negative.     Eyes: Negative.    Cardiovascular:  Negative for dyspnea on exertion, irregular heartbeat and near-syncope.   Respiratory: Negative.  Negative for cough.    Endocrine: Negative.    Skin: Negative.    Musculoskeletal: Negative.  Negative for back pain and falls.   Gastrointestinal: Negative.    Genitourinary: Negative.    Neurological: Negative.    All other systems reviewed and are negative.       History reviewed. No pertinent surgical history.     Active Ambulatory Problems     Diagnosis Date Noted    Benign hypertension 02/02/2023    ED (erectile dysfunction) 02/02/2023    Diabetes mellitus (Multi) 02/02/2023    Hypertriglyceridemia 02/02/2023    Onychomycosis of right great toe 02/02/2023    Umbilical hernia 02/02/2023    Low testosterone 07/05/2023    Tobacco dependence 07/13/2023    Encounter for annual physical exam 01/17/2024    Recurrent tinea pedis 01/18/2024    History of obesity 04/16/2024    Hypogonadism in male 04/16/2024     Gastroesophageal reflux disease 04/18/2024    Snoring 04/18/2024    Other fatigue 04/18/2024    Skin tag 04/18/2024    Flatus 04/18/2024    Colon cancer screening declined 08/20/2024    YAHAIRA on CPAP 08/20/2024    ASHD (arteriosclerotic heart disease) 10/10/2024     Resolved Ambulatory Problems     Diagnosis Date Noted    Hyperglycemia 02/02/2023    Impacted cerumen, right ear 02/02/2023    Ear fullness 02/02/2023    AGE (acute gastroenteritis) 02/02/2023    Acute bilateral thoracic back pain 04/04/2023    Morbid obesity (Multi) 07/13/2023    Impacted cerumen of right ear 07/13/2023    Onychomycosis 07/13/2023     Past Medical History:   Diagnosis Date    Morbid (severe) obesity due to excess calories (Multi) 03/29/2016    Morbid (severe) obesity due to excess calories (Multi) 03/29/2016    Personal history of other endocrine, nutritional and metabolic disease         Visit Vitals  /86 (BP Location: Right arm, Patient Position: Sitting, BP Cuff Size: Large adult)   Pulse 58   Temp 36.7 °C (98 °F) (Core)   Resp 16   Ht 1.829 m (6')   Wt 126 kg (277 lb)   SpO2 96%   BMI 37.57 kg/m²   Smoking Status Every Day   BSA 2.53 m²        Objective     Constitutional:       Appearance: Healthy appearance.   Eyes:      Pupils: Pupils are equal, round, and reactive to light.   Neck:      Vascular: No JVR. JVD normal.   Pulmonary:      Effort: Pulmonary effort is normal.      Breath sounds: Normal breath sounds.   Cardiovascular:      PMI at left midclavicular line. Normal rate. Regular rhythm. Normal S1. Normal S2.       Murmurs: There is no murmur.      No gallop.  No click. No rub.   Pulses:     Intact distal pulses.   Edema:     Peripheral edema absent.   Abdominal:      Palpations: Abdomen is soft.      Tenderness: There is no abdominal tenderness.   Musculoskeletal: Normal range of motion.      Extremities: No clubbing present.Skin:     General: Skin is warm and dry.   Neurological:      General: No focal deficit  "present.            Lab Review:         Lab Results   Component Value Date    CHOL 163 04/15/2024    CHOL 167 07/15/2023    CHOL 165 07/26/2022     Lab Results   Component Value Date    HDL 29.3 04/15/2024    HDL 26.8 (A) 07/15/2023    HDL 28.0 (A) 07/26/2022     Lab Results   Component Value Date    LDLCALC 103 (H) 04/15/2024     Lab Results   Component Value Date    TRIG 155 (H) 04/15/2024    TRIG 202 (H) 07/15/2023    TRIG 169 (H) 07/26/2022     No components found for: \"CHOLHDL\"     Assessment/Plan     ASHD (arteriosclerotic heart disease)  This is a 54-year-old white male who does have high blood pressure smoking history diabetes mellitus hypercholesterolemia family's coronary disease.  He is not complaining symptoms of angina factors nor congestive heart failure.  His cardiac calcium score is high at 400.  Because of all his risk factors feel the stress test we done and we will schedule this for him.  Will call him with results of the stress test.    Benign hypertension  His blood pressure remains high and we will start him on hydrochlorothiazide.    Hypertriglyceridemia  Because of the high cardiac calcium score will increase his Crestor to 20 mg.  Will check in approximately 2 months to see the results.     "

## 2024-10-10 NOTE — ASSESSMENT & PLAN NOTE
This is a 54-year-old white male who does have high blood pressure smoking history diabetes mellitus hypercholesterolemia family's coronary disease.  He is not complaining symptoms of angina factors nor congestive heart failure.  His cardiac calcium score is high at 400.  Because of all his risk factors feel the stress test we done and we will schedule this for him.  Will call him with results of the stress test.

## 2024-10-10 NOTE — ASSESSMENT & PLAN NOTE
Because of the high cardiac calcium score will increase his Crestor to 20 mg.  Will check in approximately 2 months to see the results.

## 2024-10-11 RX ORDER — OMEPRAZOLE 20 MG/1
40 CAPSULE, DELAYED RELEASE ORAL
Qty: 180 CAPSULE | Refills: 0 | Status: SHIPPED | OUTPATIENT
Start: 2024-10-11

## 2024-10-18 ENCOUNTER — HOSPITAL ENCOUNTER (OUTPATIENT)
Dept: RADIOLOGY | Facility: CLINIC | Age: 54
Discharge: HOME | End: 2024-10-18
Payer: COMMERCIAL

## 2024-10-18 DIAGNOSIS — N28.9 RENAL LESION: ICD-10-CM

## 2024-10-19 DIAGNOSIS — E11.9 TYPE 2 DIABETES MELLITUS WITHOUT COMPLICATION, WITHOUT LONG-TERM CURRENT USE OF INSULIN (MULTI): ICD-10-CM

## 2024-10-21 RX ORDER — SEMAGLUTIDE 2.68 MG/ML
INJECTION, SOLUTION SUBCUTANEOUS
Qty: 3 ML | Refills: 1 | Status: SHIPPED | OUTPATIENT
Start: 2024-10-21

## 2024-10-22 ENCOUNTER — HOSPITAL ENCOUNTER (OUTPATIENT)
Dept: RADIOLOGY | Facility: HOSPITAL | Age: 54
Discharge: HOME | End: 2024-10-22
Payer: COMMERCIAL

## 2024-10-22 PROCEDURE — 74183 MRI ABD W/O CNTR FLWD CNTR: CPT | Performed by: RADIOLOGY

## 2024-10-22 PROCEDURE — A9575 INJ GADOTERATE MEGLUMI 0.1ML: HCPCS | Performed by: INTERNAL MEDICINE

## 2024-10-22 PROCEDURE — 2550000001 HC RX 255 CONTRASTS: Performed by: INTERNAL MEDICINE

## 2024-10-22 PROCEDURE — 74183 MRI ABD W/O CNTR FLWD CNTR: CPT

## 2024-10-22 RX ORDER — GADOTERATE MEGLUMINE 376.9 MG/ML
20 INJECTION INTRAVENOUS
Status: COMPLETED | OUTPATIENT
Start: 2024-10-22 | End: 2024-10-22

## 2024-10-23 ENCOUNTER — HOSPITAL ENCOUNTER (OUTPATIENT)
Dept: CARDIOLOGY | Facility: HOSPITAL | Age: 54
Discharge: HOME | End: 2024-10-23
Payer: COMMERCIAL

## 2024-10-23 DIAGNOSIS — I25.10 ASHD (ARTERIOSCLEROTIC HEART DISEASE): ICD-10-CM

## 2024-10-23 DIAGNOSIS — N28.9 KIDNEY LESION: Primary | ICD-10-CM

## 2024-10-23 DIAGNOSIS — R93.1 ELEVATED CORONARY ARTERY CALCIUM SCORE: ICD-10-CM

## 2024-10-23 PROCEDURE — 93017 CV STRESS TEST TRACING ONLY: CPT

## 2024-10-24 ENCOUNTER — PATIENT MESSAGE (OUTPATIENT)
Dept: UROLOGY | Facility: HOSPITAL | Age: 54
End: 2024-10-24
Payer: COMMERCIAL

## 2024-11-04 ENCOUNTER — TELEPHONE (OUTPATIENT)
Dept: CARDIOLOGY | Facility: CLINIC | Age: 54
End: 2024-11-04
Payer: COMMERCIAL

## 2024-11-05 ENCOUNTER — TELEMEDICINE (OUTPATIENT)
Dept: UROLOGY | Facility: HOSPITAL | Age: 54
End: 2024-11-05
Payer: COMMERCIAL

## 2024-11-05 DIAGNOSIS — E29.1 HYPOGONADISM IN MALE: ICD-10-CM

## 2024-11-05 DIAGNOSIS — N28.9 KIDNEY LESION: Primary | ICD-10-CM

## 2024-11-05 PROCEDURE — G2211 COMPLEX E/M VISIT ADD ON: HCPCS | Performed by: STUDENT IN AN ORGANIZED HEALTH CARE EDUCATION/TRAINING PROGRAM

## 2024-11-05 PROCEDURE — 3044F HG A1C LEVEL LT 7.0%: CPT | Performed by: STUDENT IN AN ORGANIZED HEALTH CARE EDUCATION/TRAINING PROGRAM

## 2024-11-05 PROCEDURE — 99214 OFFICE O/P EST MOD 30 MIN: CPT | Performed by: STUDENT IN AN ORGANIZED HEALTH CARE EDUCATION/TRAINING PROGRAM

## 2024-11-05 PROCEDURE — 3049F LDL-C 100-129 MG/DL: CPT | Performed by: STUDENT IN AN ORGANIZED HEALTH CARE EDUCATION/TRAINING PROGRAM

## 2024-11-05 PROCEDURE — 4010F ACE/ARB THERAPY RXD/TAKEN: CPT | Performed by: STUDENT IN AN ORGANIZED HEALTH CARE EDUCATION/TRAINING PROGRAM

## 2024-11-05 NOTE — PROGRESS NOTES
Subjective   Patient ID: Logan Yeager is a 54 y.o. male    HPI  Today's visit was done virtually after appropriate consent from the patient.    54 y.o. male who presents with concerns about testosterone levels and cystic development in the kidney. Recent testosterone measurements show a level of 270 with a free testosterone at 38.6, indicating a lower end of the normal range. Discussed potential side effects of testosterone replacement therapy and the benefits of natural methods to increase testosterone levels by weight loss. Current medication includes daily intake of 5 and 10 mg. 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 Plans for a renal ultrasound and repeat testosterone test in six months.    The most recent PSA, conducted on 7/15/2024, revealed 0.33 ng/ml     The most recent Total testosterone, conducted on 08/10/2024, revealed:  270 ng/dl      Review of Systems    All systems were reviewed. Anything negative was noted in the HPI.    Objective   Physical Exam  Constitutional:       Appearance: Normal appearance.   HENT:      Head: Normocephalic and atraumatic.      Nose: Nose normal.   Pulmonary:      Effort: No respiratory distress.   Abdominal:      General: There is no distension.   Neurological:      Mental Status: He is alert.           Past Medical History:   Diagnosis Date    Impacted cerumen of right ear 07/13/2023    Morbid (severe) obesity due to excess calories (Multi) 03/29/2016    Morbid obesity    Morbid (severe) obesity due to excess calories (Multi) 03/29/2016    Morbid obesity    Personal history of other endocrine, nutritional and metabolic disease     History of obesity         No past surgical history on file.      Assessment/Plan   Erectile Dysfunction, Low normal testosterone, renal lesion Bosniak 2F    54 y.o. male who presents for the above condition, We had a very long and extensive discussion with the patient regarding the pathophysiology, differential  diagnosis, risk factor, and management of ED. We discussed at length mechanism of action, risk, benefit, potential complication, adverse events of PDE 5 inhibitors in the form of Tadalafil 5 mg daily and 10 mg as needed. Instructed the patient to stop the medication in case of any side effects.    Continue current management without initiating testosterone therapy. Increase tadalafil dose to 15 mg as needed, taken half an hour before intercourse. Repeat testosterone level in six months to monitor trends. If levels drop below 240-250 ng/dL, consider testosterone therapy at that time.    Plan:  - Renal US in 6 months  - Follow up in 6 months with Testosterone free and total          Scribe Attestation  By signing my name below, I, Marty Denise Scribzhane   attest that this documentation has been prepared under the direction and in the presence of Dr. Crispin Tinoco

## 2024-12-12 DIAGNOSIS — E11.9 TYPE 2 DIABETES MELLITUS WITHOUT COMPLICATION, WITHOUT LONG-TERM CURRENT USE OF INSULIN (MULTI): ICD-10-CM

## 2024-12-12 RX ORDER — SEMAGLUTIDE 2.68 MG/ML
INJECTION, SOLUTION SUBCUTANEOUS
Qty: 3 ML | Refills: 1 | Status: SHIPPED | OUTPATIENT
Start: 2024-12-12

## 2024-12-19 DIAGNOSIS — E11.9 TYPE 2 DIABETES MELLITUS WITHOUT COMPLICATION, WITHOUT LONG-TERM CURRENT USE OF INSULIN (MULTI): ICD-10-CM

## 2024-12-19 RX ORDER — METFORMIN HYDROCHLORIDE 750 MG/1
TABLET, EXTENDED RELEASE ORAL
Qty: 90 TABLET | Refills: 0 | Status: SHIPPED | OUTPATIENT
Start: 2024-12-19

## 2024-12-20 ENCOUNTER — APPOINTMENT (OUTPATIENT)
Dept: PRIMARY CARE | Facility: CLINIC | Age: 54
End: 2024-12-20
Payer: COMMERCIAL

## 2024-12-20 VITALS
HEART RATE: 56 BPM | HEIGHT: 72 IN | SYSTOLIC BLOOD PRESSURE: 134 MMHG | WEIGHT: 272 LBS | DIASTOLIC BLOOD PRESSURE: 78 MMHG | BODY MASS INDEX: 36.84 KG/M2 | OXYGEN SATURATION: 94 % | RESPIRATION RATE: 18 BRPM

## 2024-12-20 DIAGNOSIS — E11.9 TYPE 2 DIABETES MELLITUS WITHOUT COMPLICATION, WITHOUT LONG-TERM CURRENT USE OF INSULIN (MULTI): ICD-10-CM

## 2024-12-20 DIAGNOSIS — E78.1 HYPERTRIGLYCERIDEMIA: ICD-10-CM

## 2024-12-20 DIAGNOSIS — G47.33 OSA ON CPAP: ICD-10-CM

## 2024-12-20 DIAGNOSIS — K21.9 GASTROESOPHAGEAL REFLUX DISEASE WITHOUT ESOPHAGITIS: ICD-10-CM

## 2024-12-20 DIAGNOSIS — I10 BENIGN HYPERTENSION: ICD-10-CM

## 2024-12-20 LAB — POC HEMOGLOBIN A1C: 5.3 % (ref 4.2–6.5)

## 2024-12-20 PROCEDURE — 3044F HG A1C LEVEL LT 7.0%: CPT | Performed by: INTERNAL MEDICINE

## 2024-12-20 PROCEDURE — 83036 HEMOGLOBIN GLYCOSYLATED A1C: CPT | Performed by: INTERNAL MEDICINE

## 2024-12-20 PROCEDURE — 99213 OFFICE O/P EST LOW 20 MIN: CPT | Performed by: INTERNAL MEDICINE

## 2024-12-20 PROCEDURE — 4010F ACE/ARB THERAPY RXD/TAKEN: CPT | Performed by: INTERNAL MEDICINE

## 2024-12-20 PROCEDURE — 3008F BODY MASS INDEX DOCD: CPT | Performed by: INTERNAL MEDICINE

## 2024-12-20 PROCEDURE — 3078F DIAST BP <80 MM HG: CPT | Performed by: INTERNAL MEDICINE

## 2024-12-20 PROCEDURE — 3075F SYST BP GE 130 - 139MM HG: CPT | Performed by: INTERNAL MEDICINE

## 2024-12-20 PROCEDURE — 3049F LDL-C 100-129 MG/DL: CPT | Performed by: INTERNAL MEDICINE

## 2024-12-20 ASSESSMENT — ENCOUNTER SYMPTOMS
ENDOCRINE NEGATIVE: 1
MUSCULOSKELETAL NEGATIVE: 1
PSYCHIATRIC NEGATIVE: 1
EYES NEGATIVE: 1
NEUROLOGICAL NEGATIVE: 1
CONSTITUTIONAL NEGATIVE: 1
RESPIRATORY NEGATIVE: 1
CARDIOVASCULAR NEGATIVE: 1
GASTROINTESTINAL NEGATIVE: 1

## 2024-12-20 ASSESSMENT — PAIN SCALES - GENERAL: PAINLEVEL_OUTOF10: 0-NO PAIN

## 2024-12-20 NOTE — ASSESSMENT & PLAN NOTE
- HgbA1c 5.3 today  - c/w metformin XR, ozempic 2mg weekly  - Patient educated about hypoglycemia and hyperglycemia  - advised 1800 ADA diet  - Education and counseling was done  - Annual Diabetic retinopathy exam  - Annual Podiatry foot exam and self check every day.  - Regular exercise is encouraged  - Regular home monitoring of the blood sugar is advised  - Advised to take the medication as advised and compliance as needed.

## 2024-12-20 NOTE — ASSESSMENT & PLAN NOTE
- high ct calcium score of 400  - c/w crestor  - Patient educated and counseled to do walking and exercise regularly  - Low-fat low-cholesterol diet is advised  - Advised to reduce weight   - The goal is to keep the LDL less than 70, and HDL the more than 40

## 2024-12-20 NOTE — ASSESSMENT & PLAN NOTE
6/2/2024 sleep study showed severe YAHAIRA with Sp02 58%.  - pt has been using the cpap every night and is compliant with it.

## 2024-12-20 NOTE — PROGRESS NOTES
Patient ID:   Logan Yeager is a 54 y.o. 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    HEALTH MAINTENANCE: Follow Up  Last Office Visit: 9/13/2024 for annual wellness visit.  Last Labs: 4/15/2024  PSA: 0.33 on 7/15/2023 -> 0.78 on 4/15/2024  HgbA1c 6.6 on 7/15/2023 -> 7.4 -> 5.6 on 4/15/2024 -> 5.9 on 8/20/2024 -> DUE 5.3 today  Colonoscopy (45-75): Cologuard -ve 8/23/2024.  Lung cancer screening (50-76 y/o x 20 pk yr for at least 20 yrs + current smoker OR quit in last 15 years, no CT w/I last year): 9/14/2024 showing few small bilateral noncalcified pulmonary nodules. ~ calcium score of 400. Also had incidental finding of asymmetric enlargement of the upper pole of the right kidney and further imaging was recommended.  6/2/2024 sleep study showed severe YAHAIRA with Sp02 58%.  - pt has been using the cpap every night and is compliant with it.   - gets constipated and sometimes has diarrhea, attributes it to the ozempic.     Following Providers:  Urology: Dr Morales Tinoco  DPM: Dr Eloisa Villafana  Cardiology: Dr Mick Loya  - LOV 10/10/2024 to est care d/t elevated calcium score at 400. Stress test was ordered.    Social History     Tobacco Use    Smoking status: Every Day     Current packs/day: 1.00     Average packs/day: 1 pack/day for 41.0 years (41.0 ttl pk-yrs)     Types: Cigarettes     Start date: 1984     Passive exposure: Current    Smokeless tobacco: Never   Substance Use Topics    Alcohol use: Yes     Comment: socially    Drug use: Never     Review of Systems   Constitutional: Negative.    HENT: Negative.     Eyes: Negative.    Respiratory: Negative.     Cardiovascular: Negative.    Gastrointestinal: Negative.    Endocrine: Negative.    Genitourinary: Negative.    Musculoskeletal: Negative.    Skin: Negative.    Neurological: Negative.    Psychiatric/Behavioral: Negative.     All  "other systems reviewed and are negative.    Visit Vitals  /78 (BP Location: Left arm, Patient Position: Sitting)   Pulse 56   Resp 18   Ht 1.84 m (6' 0.44\")   Wt 123 kg (272 lb)   SpO2 94%   BMI 36.44 kg/m²   Smoking Status Every Day   BSA 2.51 m²     Physical Exam  Vitals reviewed. Exam conducted with a chaperone present.   Constitutional:       Appearance: Normal appearance. He is well-developed.   HENT:      Head: Normocephalic.      Right Ear: External ear normal.      Left Ear: External ear normal.      Nose: Nose normal.      Mouth/Throat:      Lips: Pink.      Mouth: Mucous membranes are moist.   Eyes:      General: Lids are normal.      Pupils: Pupils are equal, round, and reactive to light.   Neck:      Trachea: Trachea normal.   Cardiovascular:      Rate and Rhythm: Normal rate and regular rhythm.      Heart sounds: Normal heart sounds.   Pulmonary:      Effort: Pulmonary effort is normal.      Breath sounds: Normal breath sounds.   Abdominal:      General: Bowel sounds are normal.      Palpations: Abdomen is soft.   Musculoskeletal:      Cervical back: Full passive range of motion without pain.   Skin:     General: Skin is warm and moist.   Neurological:      General: No focal deficit present.      Mental Status: He is alert and oriented to person, place, and time. Mental status is at baseline.   Psychiatric:         Attention and Perception: Attention normal.         Mood and Affect: Mood normal.         Speech: Speech normal.         Behavior: Behavior is cooperative.         Thought Content: Thought content normal.         Cognition and Memory: Cognition normal.         Judgment: Judgment normal.       Current Outpatient Medications   Medication Instructions    FreeStyle glucose monitoring (True Metrix Glucose Meter) kit Testing twice daily    lisinopril 40 mg, oral, Daily    metFORMIN XR (Glucophage-XR) 750 mg 24 hr tablet TAKE 1 TABLET (750 MG) BY MOUTH ONCE DAILY IN THE EVENING. TAKE WITH " MEALS    omeprazole (PRILOSEC) 40 mg, oral, Daily before breakfast, Do not crush or chew.    OneTouch Ultra Test strip TESTING TWICE DAILY    rosuvastatin (CRESTOR) 20 mg, oral, Daily    semaglutide (Ozempic) 2 mg/dose (8 mg/3 mL) pen injector INJECT 2 MG UNDER THE SKIN EVERY 7 DAYS. CALL FOR REFILL    tadalafil (CIALIS) 5 mg, oral, Daily    tadalafil (CIALIS) 10 mg, oral, As needed      Lab Results   Component Value Date    WBC 7.7 04/15/2024    HGB 14.9 04/15/2024    HCT 47.5 04/15/2024     04/15/2024    CHOL 163 04/15/2024    TRIG 155 (H) 04/15/2024    HDL 29.3 04/15/2024    ALT 20 04/15/2024    AST 17 04/15/2024     04/15/2024    K 3.9 04/15/2024     04/15/2024    CREATININE 0.91 04/15/2024    BUN 13 04/15/2024    CO2 26 04/15/2024    TSH 1.32 04/15/2024    PSA 0.42 09/17/2020    HGBA1C 5.9 08/20/2024       Problem List Items Addressed This Visit             ICD-10-CM    Benign hypertension I10     - c/w lisinopril  Visit Vitals  /78 (BP Location: Left arm, Patient Position: Sitting)   Pulse 56   Resp 18   - All strategies to keep the blood pressure down is explained to the patient  - Regular exercise and blood pressure monitoring is encouraged  - Education and counseling was given to the patient  - advised to monitor blood pressure three times a week for three weeks and then let us know how it runs (send a [employer] message of a picture of the readings, drop recordings off to the office and/or call with them).         Diabetes mellitus (Multi) E11.9     - HgbA1c 5.3 today  - c/w metformin XR, ozempic 2mg weekly  - Patient educated about hypoglycemia and hyperglycemia  - advised 1800 ADA diet  - Education and counseling was done  - Annual Diabetic retinopathy exam  - Annual Podiatry foot exam and self check every day.  - Regular exercise is encouraged  - Regular home monitoring of the blood sugar is advised  - Advised to take the medication as advised and compliance as needed.          Relevant Orders    POCT glycosylated hemoglobin (Hb A1C) manually resulted    Hypertriglyceridemia E78.1     - high ct calcium score of 400  - c/w crestor  - Patient educated and counseled to do walking and exercise regularly  - Low-fat low-cholesterol diet is advised  - Advised to reduce weight   - The goal is to keep the LDL less than 70, and HDL the more than 40         Gastroesophageal reflux disease K21.9     - c/w omeprazole 40mg          YAHAIRA on CPAP G47.33     6/2/2024 sleep study showed severe YAHAIRA with Sp02 58%.  - pt has been using the cpap every night and is compliant with it.             --------------------  Written by Rose Marie Moody RN, acting as a scribe for Dr. Alvarado. This note accurately reflects the work and decisions made by Dr. Alvarado.     I, Dr. Alvarado, attest all medical record entries made by the scribe were under my direction and were personally dictated by me. I have reviewed the chart and agree that the record accurately reflects my performance of the history, physical exam, and assessment and plan.

## 2024-12-20 NOTE — ASSESSMENT & PLAN NOTE
- c/w lisinopril  Visit Vitals  /78 (BP Location: Left arm, Patient Position: Sitting)   Pulse 56   Resp 18   - All strategies to keep the blood pressure down is explained to the patient  - Regular exercise and blood pressure monitoring is encouraged  - Education and counseling was given to the patient  - advised to monitor blood pressure three times a week for three weeks and then let us know how it runs (send a [employer] message of a picture of the readings, drop recordings off to the office and/or call with them).

## 2025-01-07 DIAGNOSIS — K21.9 GASTROESOPHAGEAL REFLUX DISEASE, UNSPECIFIED WHETHER ESOPHAGITIS PRESENT: ICD-10-CM

## 2025-01-07 RX ORDER — OMEPRAZOLE 20 MG/1
40 CAPSULE, DELAYED RELEASE ORAL
Qty: 180 CAPSULE | Refills: 0 | Status: SHIPPED | OUTPATIENT
Start: 2025-01-07

## 2025-02-13 DIAGNOSIS — E11.9 TYPE 2 DIABETES MELLITUS WITHOUT COMPLICATION, WITHOUT LONG-TERM CURRENT USE OF INSULIN (MULTI): ICD-10-CM

## 2025-02-14 RX ORDER — SEMAGLUTIDE 2.68 MG/ML
INJECTION, SOLUTION SUBCUTANEOUS
Qty: 3 ML | Refills: 1 | Status: SHIPPED | OUTPATIENT
Start: 2025-02-14

## 2025-03-22 DIAGNOSIS — E11.9 TYPE 2 DIABETES MELLITUS WITHOUT COMPLICATION, WITHOUT LONG-TERM CURRENT USE OF INSULIN: ICD-10-CM

## 2025-03-24 RX ORDER — METFORMIN HYDROCHLORIDE 750 MG/1
TABLET, EXTENDED RELEASE ORAL
Qty: 90 TABLET | Refills: 0 | Status: SHIPPED | OUTPATIENT
Start: 2025-03-24

## 2025-03-30 DIAGNOSIS — I10 BENIGN HYPERTENSION: ICD-10-CM

## 2025-03-31 RX ORDER — LISINOPRIL 40 MG/1
40 TABLET ORAL DAILY
Qty: 90 TABLET | Refills: 1 | Status: SHIPPED | OUTPATIENT
Start: 2025-03-31

## 2025-04-10 DIAGNOSIS — E11.9 TYPE 2 DIABETES MELLITUS WITHOUT COMPLICATION, WITHOUT LONG-TERM CURRENT USE OF INSULIN: ICD-10-CM

## 2025-04-10 DIAGNOSIS — K21.9 GASTROESOPHAGEAL REFLUX DISEASE, UNSPECIFIED WHETHER ESOPHAGITIS PRESENT: ICD-10-CM

## 2025-04-10 RX ORDER — OMEPRAZOLE 20 MG/1
40 CAPSULE, DELAYED RELEASE ORAL
Qty: 180 CAPSULE | Refills: 0 | Status: SHIPPED | OUTPATIENT
Start: 2025-04-10

## 2025-04-10 RX ORDER — SEMAGLUTIDE 2.68 MG/ML
INJECTION, SOLUTION SUBCUTANEOUS
Qty: 3 ML | Refills: 1 | Status: SHIPPED | OUTPATIENT
Start: 2025-04-10

## 2025-04-29 ENCOUNTER — APPOINTMENT (OUTPATIENT)
Dept: PRIMARY CARE | Facility: CLINIC | Age: 55
End: 2025-04-29
Payer: COMMERCIAL

## 2025-04-29 VITALS
OXYGEN SATURATION: 95 % | WEIGHT: 265 LBS | SYSTOLIC BLOOD PRESSURE: 120 MMHG | RESPIRATION RATE: 18 BRPM | HEIGHT: 72 IN | HEART RATE: 60 BPM | BODY MASS INDEX: 35.89 KG/M2 | DIASTOLIC BLOOD PRESSURE: 72 MMHG

## 2025-04-29 DIAGNOSIS — Z00.00 HEALTH CARE MAINTENANCE: ICD-10-CM

## 2025-04-29 DIAGNOSIS — K21.9 GASTROESOPHAGEAL REFLUX DISEASE WITHOUT ESOPHAGITIS: ICD-10-CM

## 2025-04-29 DIAGNOSIS — I10 BENIGN HYPERTENSION: ICD-10-CM

## 2025-04-29 DIAGNOSIS — E78.1 HYPERTRIGLYCERIDEMIA: ICD-10-CM

## 2025-04-29 DIAGNOSIS — Z13.21 ENCOUNTER FOR VITAMIN DEFICIENCY SCREENING: ICD-10-CM

## 2025-04-29 DIAGNOSIS — Z12.5 SCREENING PSA (PROSTATE SPECIFIC ANTIGEN): ICD-10-CM

## 2025-04-29 DIAGNOSIS — E11.9 TYPE 2 DIABETES MELLITUS WITHOUT COMPLICATION, WITHOUT LONG-TERM CURRENT USE OF INSULIN: ICD-10-CM

## 2025-04-29 DIAGNOSIS — R53.83 OTHER FATIGUE: ICD-10-CM

## 2025-04-29 LAB — POC HEMOGLOBIN A1C: 5.7 % (ref 4.2–6.5)

## 2025-04-29 PROCEDURE — 83036 HEMOGLOBIN GLYCOSYLATED A1C: CPT | Performed by: INTERNAL MEDICINE

## 2025-04-29 PROCEDURE — 3074F SYST BP LT 130 MM HG: CPT | Performed by: INTERNAL MEDICINE

## 2025-04-29 PROCEDURE — 4010F ACE/ARB THERAPY RXD/TAKEN: CPT | Performed by: INTERNAL MEDICINE

## 2025-04-29 PROCEDURE — 3044F HG A1C LEVEL LT 7.0%: CPT | Performed by: INTERNAL MEDICINE

## 2025-04-29 PROCEDURE — 99214 OFFICE O/P EST MOD 30 MIN: CPT | Performed by: INTERNAL MEDICINE

## 2025-04-29 PROCEDURE — 3078F DIAST BP <80 MM HG: CPT | Performed by: INTERNAL MEDICINE

## 2025-04-29 PROCEDURE — 3008F BODY MASS INDEX DOCD: CPT | Performed by: INTERNAL MEDICINE

## 2025-04-29 RX ORDER — METFORMIN HYDROCHLORIDE 500 MG/1
500 TABLET, EXTENDED RELEASE ORAL
Qty: 90 TABLET | Refills: 1 | Status: SHIPPED | OUTPATIENT
Start: 2025-04-29

## 2025-04-29 ASSESSMENT — PAIN SCALES - GENERAL: PAINLEVEL_OUTOF10: 0-NO PAIN

## 2025-04-29 ASSESSMENT — PATIENT HEALTH QUESTIONNAIRE - PHQ9
2. FEELING DOWN, DEPRESSED OR HOPELESS: NOT AT ALL
1. LITTLE INTEREST OR PLEASURE IN DOING THINGS: NOT AT ALL
SUM OF ALL RESPONSES TO PHQ9 QUESTIONS 1 AND 2: 0

## 2025-04-29 NOTE — PATIENT INSTRUCTIONS
It was nice to see you in the office today!  As discussed during our visit...     Your HgbA1c was 5.7 today.    You are DUE for lab work.  -------------    Please have your 12 hour FASTING blood drawn in the next couple weeks.    You do NOT need an appointment for lab work and/or Xray's but now that [facility] was bought out by [facility] [email or web address]), an appointment for lab work is RECOMMENDED / encouraged.    Do not get lab work done while you are on steroids (prednisone, medrol dose pack, dexamethasone) unless instructed differently by Dr Alvarado because this can cause some labs to be off.  Hold Biotin, B vitamins, B Complex for 2-3 days before any blood draw (this can cause false thyroid function tests)  During the 12 hour FASTING time, you may have BLACK coffee, BLACK tea and/or water at any time.    The two nearest Outpatient Lab's to THIS office is:  [facility] (this is IN the Urgent Care building by [employer])  [address]  [place], [place] [address] [phone]    Link to Schedule an Appointment:  [email or web address]    Hours:   Monday through Friday 7:30am - 4:30pm  CLOSED Saturday and Sunday    Or you can go to ...  [facility]  [address]  [place], [place] [address] [phone]    Link to Schedule an Appointment:  [email or web address]    Hours:   Monday through Friday 7am - 12:30pm, 1pm - 4:30pm  CLOSED Saturday and Sunday     Click the blue link to take you to the website to confirm hours/location Location Search for Labwork  [email or web address]    We will contact you with the results of your blood work (once they have ALL finalized & Dr Alvarado has reviewed them) and let you know of any necessary adjustments need to be done to your plan of care.    If you do not  hear from us within 3-5 days business days of having your blood drawn, please call the [place] office at [phone].   -----------  The  [employer] offers affordable laboratory tests.   Financial assistance is also available to those who meet financial eligibility requirements by submitting an [facility] Application or calling, [phone].   Click this link to Get a Price Estimate Today on what lab work may cost you.       [facility]  [address],  [address]  [place] [address]  Get Directions  13.13 mi away  Schedule [phone]  Hours  Monday:7:00 am-5:00 pm  Tuesday:7:00 am-5:00 pm  Wednesday:7:00 am-5:00 pm  Thursday:7:00 am-5:00 pm  Friday:7:00 am-5:00 pm  Saturday:8:00 am-12:00 pm  Sunday:Closed

## 2025-04-29 NOTE — PROGRESS NOTES
"Patient ID:   Logan Yeager is a 54 y.o. 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    HEALTH MAINTENANCE: Follow Up  Last Office Visit: 12/202/2024 for 4m FU. A1c was 5.3.  Last Labs: 4/15/2024 -> DUE  PSA Screening (starting at age 55 till 69): 0.78 on 4/15/2024 -> DUE  HgbA1c 5.3 on 12/20/2024 (was 5.9 prior to that) -> DUE 5.7  Colonoscopy (45-75): Cologuard -ve 8/23/2024. Never had colonoscopy.  Lung cancer screening (50-78 y/o x 20 pk yr for at least 20 yrs + current smoker OR quit in last 15 years, no CT w/I last year): 9/14/2024 showing few small bilateral noncalcified pulmonary nodules. ~ calcium score of 400. Also had incidental finding of asymmetric enlargement of the upper pole of the right kidney and further imaging was recommended.  6/2/2024 sleep study showed severe YAHAIRA with Sp02 58%.  Stress test 10/23/2024 -ve for ischemia.    Went down in size with pants.  Feeling ok, no complaints. Bowel and bladder are ok. Has increased diarrhea.    Following Providers:  Urology: Dr Morales Tinoco  DPM: Dr Eloisa Villafana  Cardiology: Dr Mick Loya    Social History[1]  Review of Systems   Constitutional: Negative.    HENT: Negative.     Eyes: Negative.    Respiratory: Negative.     Cardiovascular: Negative.    Gastrointestinal: Negative.    Endocrine: Negative.    Genitourinary: Negative.    Musculoskeletal: Negative.    Skin: Negative.    Neurological: Negative.    Psychiatric/Behavioral: Negative.     All other systems reviewed and are negative.    Visit Vitals  /72 (BP Location: Left arm, Patient Position: Sitting)   Pulse 60   Resp 18   Ht 1.84 m (6' 0.44\")   Wt 120 kg (265 lb)   SpO2 95%   BMI 35.51 kg/m²   Smoking Status Every Day   BSA 2.48 m²         6/2/2024     7:40 PM 8/20/2024    10:40 AM 9/13/2024    11:59 AM 10/10/2024     9:29 AM 10/18/2024    12:36 PM 12/20/2024     8:26 " "AM 4/29/2025     2:32 PM   Vitals   Systolic 136 134 103 140  134 120   Diastolic 79 82 66 86  78 72   BP Location  Left arm Left arm Right arm  Left arm Left arm   Heart Rate 56 56 66 58  56 60   Temp    36.7 °C (98 °F)      Resp 14 18 18 16  18 18   Height  1.829 m (6') 1.829 m (6') 1.829 m (6') 1.84 m (6' 0.44\") 1.84 m (6' 0.44\") 1.84 m (6' 0.44\")   Weight (lb)  289 281 277 277 272 265   BMI  39.2 kg/m2 38.11 kg/m2 37.57 kg/m2 37.11 kg/m2 36.44 kg/m2 35.51 kg/m2   BSA (m2)  2.58 m2 2.54 m2 2.53 m2 2.54 m2 2.51 m2 2.48 m2   Visit Report  Report Report Report  Report Report      Physical Exam  Vitals reviewed. Exam conducted with a chaperone present.   Constitutional:       Appearance: Normal appearance. He is well-developed.   HENT:      Head: Normocephalic.      Right Ear: External ear normal.      Left Ear: External ear normal.      Nose: Nose normal.      Mouth/Throat:      Lips: Pink.      Mouth: Mucous membranes are moist.   Eyes:      General: Lids are normal.      Pupils: Pupils are equal, round, and reactive to light.   Neck:      Trachea: Trachea normal.   Cardiovascular:      Rate and Rhythm: Normal rate and regular rhythm.      Heart sounds: Normal heart sounds.   Pulmonary:      Effort: Pulmonary effort is normal.      Breath sounds: Normal breath sounds.   Abdominal:      General: Bowel sounds are normal.      Palpations: Abdomen is soft.   Musculoskeletal:      Cervical back: Full passive range of motion without pain.   Skin:     General: Skin is warm and moist.   Neurological:      General: No focal deficit present.      Mental Status: He is alert and oriented to person, place, and time. Mental status is at baseline.   Psychiatric:         Attention and Perception: Attention normal.         Mood and Affect: Mood normal.         Speech: Speech normal.         Behavior: Behavior is cooperative.         Thought Content: Thought content normal.         Cognition and Memory: Cognition normal.         " Judgment: Judgment normal.       Current Outpatient Medications   Medication Instructions    lisinopril 40 mg, oral, Daily    metFORMIN XR (Glucophage-XR) 750 mg 24 hr tablet TAKE 1 TABLET (750 MG) BY MOUTH ONCE DAILY IN THE EVENING. TAKE WITH MEALS    omeprazole (PRILOSEC) 40 mg, oral, Daily before breakfast, Do not crush or chew.    OneTouch Ultra Test strip TESTING TWICE DAILY    Ozempic 2 mg/dose (8 mg/3 mL) pen injector INJECT 2 MG UNDER THE SKIN EVERY 7 DAYS. CALL FOR REFILL    rosuvastatin (CRESTOR) 20 mg, oral, Daily    tadalafil (CIALIS) 5 mg, oral, Daily    tadalafil (CIALIS) 10 mg, oral, As needed      Lab Results   Component Value Date    WBC 7.7 04/15/2024    HGB 14.9 04/15/2024    HCT 47.5 04/15/2024     04/15/2024    CHOL 163 04/15/2024    TRIG 155 (H) 04/15/2024    HDL 29.3 04/15/2024    ALT 20 04/15/2024    AST 17 04/15/2024     04/15/2024    K 3.9 04/15/2024     04/15/2024    CREATININE 0.91 04/15/2024    BUN 13 04/15/2024    CO2 26 04/15/2024    TSH 1.32 04/15/2024    PSA 0.42 09/17/2020    HGBA1C 5.7 04/29/2025       Problem List Items Addressed This Visit           ICD-10-CM    Benign hypertension I10    - c/w lisinopril  Visit Vitals  /72 (BP Location: Left arm, Patient Position: Sitting)   Pulse 60   Resp 18   - All strategies to keep the blood pressure down is explained to the patient  - Regular exercise and blood pressure monitoring is encouraged         Relevant Orders    CBC and Auto Differential    Comprehensive Metabolic Panel    Lipid Panel    Diabetes mellitus (Multi) E11.9    HgbA1c 5.3 on 12/20/2024 (was 5.9 prior to that) -> DUE 5.7  - c/w metformin XR 750mg daily, ozempic 2mg weekly  - Patient educated about hypoglycemia and hyperglycemia  - advised 1800 ADA diet  - Education and counseling was done  - Annual Diabetic retinopathy exam  - Annual Podiatry foot exam and self check every day.  - Regular exercise is encouraged  - Regular home monitoring of the  blood sugar is advised  - Advised to take the medication as advised and compliance as needed.         Relevant Medications    metFORMIN XR (Glucophage-XR) 500 mg 24 hr tablet    Other Relevant Orders    POCT glycosylated hemoglobin (Hb A1C) manually resulted (Completed)    Albumin-Creatinine Ratio, Urine Random    Hypertriglyceridemia E78.1    - high ct calcium score of 400  - c/w crestor  - Patient educated and counseled to do walking and exercise regularly  - Low-fat low-cholesterol diet is advised  - Advised to reduce weight   - The goal is to keep the LDL less than 70, and HDL the more than 40         Gastroesophageal reflux disease K21.9    - c/w PPI         Other fatigue R53.83    - will check labs         Relevant Orders    Lipid Panel    TSH with reflex to Free T4 if abnormal    Vitamin D 25-Hydroxy,Total (for eval of Vitamin D levels)    Vitamin B12     Other Visit Diagnoses         Codes      Health care maintenance     Z00.00    Relevant Orders    CBC and Auto Differential    Comprehensive Metabolic Panel      Screening PSA (prostate specific antigen)     Z12.5    Relevant Orders    Prostate Specific Antigen, Screen      Encounter for vitamin deficiency screening     Z13.21    Relevant Orders    Lipid Panel    TSH with reflex to Free T4 if abnormal    Vitamin D 25-Hydroxy,Total (for eval of Vitamin D levels)    Vitamin B12            --------------------  Written by Rose Marie Moody RN, acting as a scribe for Dr. Alvarado. This note accurately reflects the work and decisions made by Dr. Alvarado.     I, Dr. Alvarado, attest all medical record entries made by the scribe were under my direction and were personally dictated by me. I have reviewed the chart and agree that the record accurately reflects my performance of the history, physical exam, and assessment and plan.          [1]   Social History  Tobacco Use    Smoking status: Every Day     Current packs/day: 1.00     Average packs/day: 1 pack/day for 41.3 years  (41.3 ttl pk-yrs)     Types: Cigarettes     Start date: 1984     Passive exposure: Current    Smokeless tobacco: Never   Substance Use Topics    Alcohol use: Yes     Comment: socially    Drug use: Never

## 2025-04-30 ASSESSMENT — ENCOUNTER SYMPTOMS
CARDIOVASCULAR NEGATIVE: 1
MUSCULOSKELETAL NEGATIVE: 1
RESPIRATORY NEGATIVE: 1
CONSTITUTIONAL NEGATIVE: 1
PSYCHIATRIC NEGATIVE: 1
GASTROINTESTINAL NEGATIVE: 1
NEUROLOGICAL NEGATIVE: 1
ENDOCRINE NEGATIVE: 1
EYES NEGATIVE: 1

## 2025-04-30 NOTE — ASSESSMENT & PLAN NOTE
HgbA1c 5.3 on 12/20/2024 (was 5.9 prior to that) -> DUE 5.7  - c/w metformin XR 750mg daily, ozempic 2mg weekly  - Patient educated about hypoglycemia and hyperglycemia  - advised 1800 ADA diet  - Education and counseling was done  - Annual Diabetic retinopathy exam  - Annual Podiatry foot exam and self check every day.  - Regular exercise is encouraged  - Regular home monitoring of the blood sugar is advised  - Advised to take the medication as advised and compliance as needed.

## 2025-04-30 NOTE — ASSESSMENT & PLAN NOTE
- c/w lisinopril  Visit Vitals  /72 (BP Location: Left arm, Patient Position: Sitting)   Pulse 60   Resp 18   - All strategies to keep the blood pressure down is explained to the patient  - Regular exercise and blood pressure monitoring is encouraged

## 2025-05-03 LAB
25(OH)D3+25(OH)D2 SERPL-MCNC: 23 NG/ML (ref 30–100)
ALBUMIN SERPL-MCNC: 4.3 G/DL (ref 3.6–5.1)
ALP SERPL-CCNC: 69 U/L (ref 35–144)
ALT SERPL-CCNC: 16 U/L (ref 9–46)
ANION GAP SERPL CALCULATED.4IONS-SCNC: 8 MMOL/L (CALC) (ref 7–17)
AST SERPL-CCNC: 14 U/L (ref 10–35)
BASOPHILS # BLD AUTO: 30 CELLS/UL (ref 0–200)
BASOPHILS NFR BLD AUTO: 0.5 %
BILIRUB SERPL-MCNC: 0.5 MG/DL (ref 0.2–1.2)
BUN SERPL-MCNC: 12 MG/DL (ref 7–25)
CALCIUM SERPL-MCNC: 8.9 MG/DL (ref 8.6–10.3)
CHLORIDE SERPL-SCNC: 105 MMOL/L (ref 98–110)
CHOLEST SERPL-MCNC: 103 MG/DL
CHOLEST/HDLC SERPL: 3 (CALC)
CO2 SERPL-SCNC: 29 MMOL/L (ref 20–32)
CREAT SERPL-MCNC: 0.89 MG/DL (ref 0.7–1.3)
EGFRCR SERPLBLD CKD-EPI 2021: 101 ML/MIN/1.73M2
EOSINOPHIL # BLD AUTO: 102 CELLS/UL (ref 15–500)
EOSINOPHIL NFR BLD AUTO: 1.7 %
ERYTHROCYTE [DISTWIDTH] IN BLOOD BY AUTOMATED COUNT: 13.3 % (ref 11–15)
GLUCOSE SERPL-MCNC: 98 MG/DL (ref 65–99)
HCT VFR BLD AUTO: 44.6 % (ref 38.5–50)
HDLC SERPL-MCNC: 34 MG/DL
HGB BLD-MCNC: 14.3 G/DL (ref 13.2–17.1)
LDLC SERPL CALC-MCNC: 53 MG/DL (CALC)
LYMPHOCYTES # BLD AUTO: 1872 CELLS/UL (ref 850–3900)
LYMPHOCYTES NFR BLD AUTO: 31.2 %
MCH RBC QN AUTO: 28.9 PG (ref 27–33)
MCHC RBC AUTO-ENTMCNC: 32.1 G/DL (ref 32–36)
MCV RBC AUTO: 90.1 FL (ref 80–100)
MONOCYTES # BLD AUTO: 534 CELLS/UL (ref 200–950)
MONOCYTES NFR BLD AUTO: 8.9 %
NEUTROPHILS # BLD AUTO: 3462 CELLS/UL (ref 1500–7800)
NEUTROPHILS NFR BLD AUTO: 57.7 %
NONHDLC SERPL-MCNC: 69 MG/DL (CALC)
PLATELET # BLD AUTO: 257 THOUSAND/UL (ref 140–400)
PMV BLD REES-ECKER: 10.4 FL (ref 7.5–12.5)
POTASSIUM SERPL-SCNC: 4.1 MMOL/L (ref 3.5–5.3)
PROT SERPL-MCNC: 7.4 G/DL (ref 6.1–8.1)
PSA SERPL-MCNC: 0.73 NG/ML
RBC # BLD AUTO: 4.95 MILLION/UL (ref 4.2–5.8)
SODIUM SERPL-SCNC: 142 MMOL/L (ref 135–146)
TRIGL SERPL-MCNC: 77 MG/DL
TSH SERPL-ACNC: 0.83 MIU/L (ref 0.4–4.5)
VIT B12 SERPL-MCNC: 304 PG/ML (ref 200–1100)
WBC # BLD AUTO: 6 THOUSAND/UL (ref 3.8–10.8)

## 2025-05-05 ENCOUNTER — HOSPITAL ENCOUNTER (OUTPATIENT)
Dept: RADIOLOGY | Facility: HOSPITAL | Age: 55
Discharge: HOME | End: 2025-05-05
Payer: COMMERCIAL

## 2025-05-05 DIAGNOSIS — N28.9 KIDNEY LESION: ICD-10-CM

## 2025-05-05 LAB
ALBUMIN/CREAT UR: 3 MG/G CREAT
CREAT UR-MCNC: 198 MG/DL (ref 20–320)
MICROALBUMIN UR-MCNC: 0.5 MG/DL

## 2025-05-05 PROCEDURE — 76770 US EXAM ABDO BACK WALL COMP: CPT

## 2025-05-05 PROCEDURE — 76770 US EXAM ABDO BACK WALL COMP: CPT | Performed by: RADIOLOGY

## 2025-05-06 LAB
TESTOST FREE SERPL-MCNC: 44 PG/ML (ref 35–155)
TESTOST SERPL-MCNC: 341 NG/DL (ref 250–1100)

## 2025-05-12 NOTE — PROGRESS NOTES
Subjective   Patient ID: Logan Yeager is a 54 y.o. male    HPI  Today's visit was done virtually after appropriate consent from the patient.    54 y.o. male who presents fro a 6 months follow-up visit with concerns about testosterone levels and cystic development in the kidney. Recent testosterone measurements show a level of 270 with a free testosterone at 38.6, indicating a lower end of the normal range. Discussed potential side effects of testosterone replacement therapy and the benefits of natural methods to increase testosterone levels by weight loss. Current medication includes daily intake of 5 and 10 mg. 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 Plans for a renal ultrasound and repeat testosterone test in six months.    Today, he declines taking any TRT. He is currently taking Ozempic 2 mg every 7 days.    The most recent Testosterone,Free and Total, conducted on 05/02/2025, revealed:  TESTOSTERONE, TOTAL, MS        TESTOSTERONE, FREE                                                           341 ng/dL                              44.0 pg/mL                                                The most recent PSA, conducted on 05/02/2025, revealed:  0.73 ng/mL    The most recent Renal US, conducted on 05/05/2025, revealed:  Right renal upper pole 7.8 cm complex cystic lesion with an intrinsic  peripheral more solid hyperechoic solid appearing component more  prominent from prior. Cystic neoplasm can not be excluded. Renal MRI  with contrast may be considered for further characterization.      No hydronephrosis bilaterally.      No focal urinary bladder abnormality identified.      Review of Systems    All systems were reviewed. Anything negative was noted in the HPI.    Objective   Physical Exam  Constitutional:       Appearance: Normal appearance.   HENT:      Head: Normocephalic and atraumatic.      Nose: Nose normal.   Pulmonary:      Effort: No respiratory distress.   Abdominal:       General: There is no distension.   Neurological:      Mental Status: He is alert.         Past Medical History:   Diagnosis Date    Impacted cerumen of right ear 07/13/2023    Morbid (severe) obesity due to excess calories (Multi) 03/29/2016    Morbid obesity    Morbid (severe) obesity due to excess calories (Multi) 03/29/2016    Morbid obesity    Personal history of other endocrine, nutritional and metabolic disease     History of obesity       No past surgical history on file.      Assessment/Plan   Erectile Dysfunction, Low normal testosterone improved markedly, renal lesion Bosniak 2F with possible solid component on renal US, Bosniak 2F MRI kidneys 6 months ago    54 y.o. male who presents for the above condition, We had a very long and extensive discussion with the patient regarding the pathophysiology, differential diagnosis, risk factor, and management of ED. We discussed at length mechanism of action, risk, benefit, potential complication, adverse events of PDE 5 inhibitors in the form of Tadalafil 5 mg daily and 10 mg as needed. Instructed the patient to stop the medication in case of any side effects.    Continue current management without initiating testosterone therapy. Increase tadalafil dose to 15 mg as needed, taken half an hour before intercourse. Repeat testosterone level in six months to monitor trends. If levels drop below 240-250 ng/dL, consider testosterone therapy at that time.    Plan:  - Follow-up in 6 months with:      - MRI kidney      E&M visit today is associated with current or anticipated ongoing medical care services related to a patient's single, serious condition or a complex condition.     5/13/2025    Scribe Attestation  By signing my name below, ILaury Scribe attest that this documentation has been prepared under the direction and in the presence of Dr. Crispin Tinoco.

## 2025-05-13 ENCOUNTER — TELEMEDICINE (OUTPATIENT)
Dept: UROLOGY | Facility: HOSPITAL | Age: 55
End: 2025-05-13
Payer: COMMERCIAL

## 2025-05-13 DIAGNOSIS — N28.9 KIDNEY LESION: Primary | ICD-10-CM

## 2025-05-13 PROCEDURE — G2211 COMPLEX E/M VISIT ADD ON: HCPCS | Performed by: STUDENT IN AN ORGANIZED HEALTH CARE EDUCATION/TRAINING PROGRAM

## 2025-05-13 PROCEDURE — 4010F ACE/ARB THERAPY RXD/TAKEN: CPT | Performed by: STUDENT IN AN ORGANIZED HEALTH CARE EDUCATION/TRAINING PROGRAM

## 2025-05-13 PROCEDURE — 99214 OFFICE O/P EST MOD 30 MIN: CPT | Performed by: STUDENT IN AN ORGANIZED HEALTH CARE EDUCATION/TRAINING PROGRAM

## 2025-05-13 PROCEDURE — 3044F HG A1C LEVEL LT 7.0%: CPT | Performed by: STUDENT IN AN ORGANIZED HEALTH CARE EDUCATION/TRAINING PROGRAM

## 2025-06-04 DIAGNOSIS — E11.9 TYPE 2 DIABETES MELLITUS WITHOUT COMPLICATION, WITHOUT LONG-TERM CURRENT USE OF INSULIN: ICD-10-CM

## 2025-06-04 RX ORDER — SEMAGLUTIDE 2.68 MG/ML
INJECTION, SOLUTION SUBCUTANEOUS
Qty: 2 ML | Refills: 1 | Status: SHIPPED | OUTPATIENT
Start: 2025-06-04

## 2025-07-09 DIAGNOSIS — K21.9 GASTROESOPHAGEAL REFLUX DISEASE, UNSPECIFIED WHETHER ESOPHAGITIS PRESENT: ICD-10-CM

## 2025-07-09 RX ORDER — OMEPRAZOLE 20 MG/1
40 CAPSULE, DELAYED RELEASE ORAL
Qty: 180 CAPSULE | Refills: 0 | Status: SHIPPED | OUTPATIENT
Start: 2025-07-09

## 2025-08-02 DIAGNOSIS — E11.9 TYPE 2 DIABETES MELLITUS WITHOUT COMPLICATION, WITHOUT LONG-TERM CURRENT USE OF INSULIN: ICD-10-CM

## 2025-08-04 RX ORDER — SEMAGLUTIDE 2.68 MG/ML
INJECTION, SOLUTION SUBCUTANEOUS
Qty: 3 ML | Refills: 1 | Status: SHIPPED | OUTPATIENT
Start: 2025-08-04

## 2025-08-08 ENCOUNTER — PATIENT MESSAGE (OUTPATIENT)
Dept: UROLOGY | Facility: HOSPITAL | Age: 55
End: 2025-08-08
Payer: COMMERCIAL

## 2025-08-11 DIAGNOSIS — N52.9 ERECTILE DYSFUNCTION, UNSPECIFIED ERECTILE DYSFUNCTION TYPE: Primary | ICD-10-CM

## 2025-08-11 RX ORDER — TADALAFIL 10 MG/1
10 TABLET ORAL AS NEEDED
Qty: 15 TABLET | Refills: 3 | Status: SHIPPED | OUTPATIENT
Start: 2025-08-11 | End: 2025-09-10

## 2025-08-11 RX ORDER — TADALAFIL 5 MG/1
5 TABLET ORAL DAILY
Qty: 90 TABLET | Refills: 1 | Status: SHIPPED | OUTPATIENT
Start: 2025-08-11 | End: 2026-02-07

## 2025-09-03 ENCOUNTER — APPOINTMENT (OUTPATIENT)
Dept: PRIMARY CARE | Facility: CLINIC | Age: 55
End: 2025-09-03
Payer: COMMERCIAL

## 2025-09-03 ASSESSMENT — PAIN SCALES - GENERAL: PAINLEVEL_OUTOF10: 2

## 2025-09-04 ASSESSMENT — ENCOUNTER SYMPTOMS
PSYCHIATRIC NEGATIVE: 1
CONSTITUTIONAL NEGATIVE: 1
ARTHRALGIAS: 1
NUMBNESS: 1
RESPIRATORY NEGATIVE: 1
CARDIOVASCULAR NEGATIVE: 1
GASTROINTESTINAL NEGATIVE: 1

## 2025-11-20 ENCOUNTER — APPOINTMENT (OUTPATIENT)
Dept: UROLOGY | Facility: HOSPITAL | Age: 55
End: 2025-11-20
Payer: COMMERCIAL

## 2026-01-09 ENCOUNTER — APPOINTMENT (OUTPATIENT)
Dept: PRIMARY CARE | Facility: CLINIC | Age: 56
End: 2026-01-09
Payer: COMMERCIAL